# Patient Record
Sex: FEMALE | Race: WHITE | Employment: OTHER | URBAN - NONMETROPOLITAN AREA
[De-identification: names, ages, dates, MRNs, and addresses within clinical notes are randomized per-mention and may not be internally consistent; named-entity substitution may affect disease eponyms.]

---

## 2017-01-04 RX ORDER — CLONAZEPAM 0.5 MG/1
TABLET ORAL
Qty: 30 TABLET | Refills: 0 | Status: SHIPPED | OUTPATIENT
Start: 2017-01-04 | End: 2017-01-27 | Stop reason: SDUPTHER

## 2017-01-27 ENCOUNTER — TELEPHONE (OUTPATIENT)
Dept: PSYCHIATRY | Age: 67
End: 2017-01-27

## 2017-01-27 ENCOUNTER — OFFICE VISIT (OUTPATIENT)
Dept: PSYCHIATRY | Age: 67
End: 2017-01-27
Payer: COMMERCIAL

## 2017-01-27 VITALS
OXYGEN SATURATION: 93 % | WEIGHT: 196.6 LBS | HEART RATE: 65 BPM | SYSTOLIC BLOOD PRESSURE: 150 MMHG | HEIGHT: 70 IN | BODY MASS INDEX: 28.15 KG/M2 | DIASTOLIC BLOOD PRESSURE: 80 MMHG

## 2017-01-27 DIAGNOSIS — F43.21 COMPLICATED GRIEF: ICD-10-CM

## 2017-01-27 DIAGNOSIS — F32.9 MAJOR DEPRESSIVE DISORDER, SINGLE EPISODE, UNSPECIFIED: Primary | ICD-10-CM

## 2017-01-27 DIAGNOSIS — F41.1 GENERALIZED ANXIETY DISORDER: ICD-10-CM

## 2017-01-27 PROCEDURE — 99214 OFFICE O/P EST MOD 30 MIN: CPT | Performed by: NURSE PRACTITIONER

## 2017-01-27 RX ORDER — CLONAZEPAM 0.5 MG/1
TABLET ORAL
Qty: 30 TABLET | Refills: 0 | Status: SHIPPED | OUTPATIENT
Start: 2017-01-27 | End: 2017-03-10 | Stop reason: SDUPTHER

## 2017-01-27 RX ORDER — TRAZODONE HYDROCHLORIDE 150 MG/1
TABLET ORAL
Qty: 180 TABLET | Refills: 1 | Status: SHIPPED | OUTPATIENT
Start: 2017-01-27 | End: 2017-07-06 | Stop reason: SDUPTHER

## 2017-03-10 RX ORDER — CLONAZEPAM 0.5 MG/1
TABLET ORAL
Qty: 30 TABLET | Refills: 0 | Status: SHIPPED | OUTPATIENT
Start: 2017-03-10 | End: 2017-04-12 | Stop reason: SDUPTHER

## 2017-03-15 ENCOUNTER — OFFICE VISIT (OUTPATIENT)
Dept: PSYCHIATRY | Age: 67
End: 2017-03-15
Payer: COMMERCIAL

## 2017-03-15 VITALS
BODY MASS INDEX: 28.6 KG/M2 | OXYGEN SATURATION: 93 % | HEART RATE: 66 BPM | WEIGHT: 199.8 LBS | DIASTOLIC BLOOD PRESSURE: 84 MMHG | SYSTOLIC BLOOD PRESSURE: 139 MMHG | HEIGHT: 70 IN

## 2017-03-15 DIAGNOSIS — F32.9 MAJOR DEPRESSIVE DISORDER, SINGLE EPISODE, UNSPECIFIED: Primary | ICD-10-CM

## 2017-03-15 PROCEDURE — 99213 OFFICE O/P EST LOW 20 MIN: CPT | Performed by: PSYCHIATRY & NEUROLOGY

## 2017-04-12 RX ORDER — CLONAZEPAM 0.5 MG/1
TABLET ORAL
Qty: 30 TABLET | Refills: 0 | Status: SHIPPED | OUTPATIENT
Start: 2017-04-12 | End: 2017-05-12 | Stop reason: SDUPTHER

## 2017-05-01 RX ORDER — ARIPIPRAZOLE 2 MG/1
TABLET ORAL
Qty: 90 TABLET | Refills: 1 | Status: SHIPPED | OUTPATIENT
Start: 2017-05-01 | End: 2018-01-02 | Stop reason: SDUPTHER

## 2017-05-01 RX ORDER — FLUOXETINE HYDROCHLORIDE 40 MG/1
40 CAPSULE ORAL DAILY
Qty: 90 CAPSULE | Refills: 1 | Status: SHIPPED | OUTPATIENT
Start: 2017-05-01 | End: 2018-01-02 | Stop reason: SDUPTHER

## 2017-05-01 RX ORDER — FLUOXETINE HYDROCHLORIDE 20 MG/1
20 CAPSULE ORAL DAILY
Qty: 90 CAPSULE | Refills: 1 | Status: SHIPPED | OUTPATIENT
Start: 2017-05-01 | End: 2018-01-02 | Stop reason: SDUPTHER

## 2017-05-12 RX ORDER — CLONAZEPAM 0.5 MG/1
TABLET ORAL
Qty: 30 TABLET | Refills: 0 | Status: SHIPPED | OUTPATIENT
Start: 2017-05-12 | End: 2017-06-12 | Stop reason: SDUPTHER

## 2017-06-12 RX ORDER — CLONAZEPAM 0.5 MG/1
TABLET ORAL
Qty: 30 TABLET | Refills: 0 | Status: SHIPPED | OUTPATIENT
Start: 2017-06-12 | End: 2018-01-02 | Stop reason: SDUPTHER

## 2017-06-26 ENCOUNTER — OFFICE VISIT (OUTPATIENT)
Dept: PSYCHIATRY | Age: 67
End: 2017-06-26
Payer: COMMERCIAL

## 2017-06-26 VITALS
WEIGHT: 204.6 LBS | DIASTOLIC BLOOD PRESSURE: 87 MMHG | SYSTOLIC BLOOD PRESSURE: 154 MMHG | HEIGHT: 70 IN | OXYGEN SATURATION: 94 % | BODY MASS INDEX: 29.29 KG/M2 | HEART RATE: 82 BPM

## 2017-06-26 DIAGNOSIS — F43.21 COMPLICATED GRIEF: ICD-10-CM

## 2017-06-26 DIAGNOSIS — F32.9 MAJOR DEPRESSIVE DISORDER, SINGLE EPISODE, UNSPECIFIED: Primary | ICD-10-CM

## 2017-06-26 PROCEDURE — 90833 PSYTX W PT W E/M 30 MIN: CPT | Performed by: PSYCHIATRY & NEUROLOGY

## 2017-06-26 PROCEDURE — 99213 OFFICE O/P EST LOW 20 MIN: CPT | Performed by: PSYCHIATRY & NEUROLOGY

## 2017-06-26 RX ORDER — ATORVASTATIN CALCIUM 40 MG/1
TABLET, FILM COATED ORAL
COMMUNITY
Start: 2017-06-07

## 2017-06-26 RX ORDER — MONTELUKAST SODIUM 10 MG/1
TABLET ORAL
COMMUNITY
Start: 2017-04-22

## 2017-06-26 RX ORDER — METHYLPHENIDATE HYDROCHLORIDE 5 MG/1
5 TABLET ORAL 2 TIMES DAILY
Qty: 60 TABLET | Refills: 0 | Status: SHIPPED | OUTPATIENT
Start: 2017-06-26 | End: 2017-07-24 | Stop reason: SDUPTHER

## 2017-06-26 RX ORDER — MONTELUKAST SODIUM 4 MG/1
TABLET, CHEWABLE ORAL
COMMUNITY
Start: 2017-04-22 | End: 2018-07-13 | Stop reason: SDUPTHER

## 2017-07-10 RX ORDER — TRAZODONE HYDROCHLORIDE 150 MG/1
TABLET ORAL
Qty: 180 TABLET | Refills: 1 | Status: SHIPPED | OUTPATIENT
Start: 2017-07-10 | End: 2018-01-02 | Stop reason: SDUPTHER

## 2017-07-11 RX ORDER — CLONAZEPAM 0.5 MG/1
0.5 TABLET ORAL 2 TIMES DAILY
Qty: 60 TABLET | Refills: 3 | Status: SHIPPED | OUTPATIENT
Start: 2017-07-11 | End: 2018-01-02 | Stop reason: SDUPTHER

## 2017-07-26 RX ORDER — METHYLPHENIDATE HYDROCHLORIDE 5 MG/1
5 TABLET ORAL 2 TIMES DAILY
Qty: 60 TABLET | Refills: 0 | Status: SHIPPED | OUTPATIENT
Start: 2017-07-26 | End: 2017-08-23 | Stop reason: SDUPTHER

## 2017-08-23 RX ORDER — METHYLPHENIDATE HYDROCHLORIDE 5 MG/1
5 TABLET ORAL 2 TIMES DAILY
Qty: 60 TABLET | Refills: 0 | Status: SHIPPED | OUTPATIENT
Start: 2017-08-23 | End: 2017-09-22

## 2017-09-27 ENCOUNTER — TELEPHONE (OUTPATIENT)
Dept: PSYCHIATRY | Age: 67
End: 2017-09-27

## 2017-09-27 ENCOUNTER — OFFICE VISIT (OUTPATIENT)
Dept: PSYCHIATRY | Age: 67
End: 2017-09-27
Payer: COMMERCIAL

## 2017-09-27 VITALS
BODY MASS INDEX: 30.07 KG/M2 | HEIGHT: 69 IN | DIASTOLIC BLOOD PRESSURE: 76 MMHG | SYSTOLIC BLOOD PRESSURE: 141 MMHG | WEIGHT: 203 LBS | HEART RATE: 76 BPM | OXYGEN SATURATION: 94 %

## 2017-09-27 DIAGNOSIS — F41.1 GENERALIZED ANXIETY DISORDER: ICD-10-CM

## 2017-09-27 DIAGNOSIS — F32.9 MAJOR DEPRESSIVE DISORDER, SINGLE EPISODE, UNSPECIFIED: Primary | ICD-10-CM

## 2017-09-27 PROCEDURE — 90833 PSYTX W PT W E/M 30 MIN: CPT | Performed by: PSYCHIATRY & NEUROLOGY

## 2017-09-27 PROCEDURE — 99213 OFFICE O/P EST LOW 20 MIN: CPT | Performed by: PSYCHIATRY & NEUROLOGY

## 2017-09-27 RX ORDER — METHYLPHENIDATE HYDROCHLORIDE 5 MG/1
5 TABLET ORAL 2 TIMES DAILY
Qty: 60 TABLET | Refills: 0 | Status: SHIPPED | OUTPATIENT
Start: 2017-09-27 | End: 2017-11-20 | Stop reason: SDUPTHER

## 2017-09-27 RX ORDER — FLUOXETINE HYDROCHLORIDE 40 MG/1
40 CAPSULE ORAL DAILY
Qty: 90 CAPSULE | Refills: 1 | Status: SHIPPED | OUTPATIENT
Start: 2017-09-27 | End: 2018-01-02 | Stop reason: SDUPTHER

## 2017-09-27 RX ORDER — FLUOXETINE HYDROCHLORIDE 20 MG/1
20 CAPSULE ORAL DAILY
Qty: 90 CAPSULE | Refills: 1 | Status: SHIPPED | OUTPATIENT
Start: 2017-09-27 | End: 2018-01-02 | Stop reason: SDUPTHER

## 2017-09-27 RX ORDER — CLONAZEPAM 0.5 MG/1
0.5 TABLET ORAL 3 TIMES DAILY PRN
Qty: 90 TABLET | Refills: 2 | Status: SHIPPED | OUTPATIENT
Start: 2017-09-27 | End: 2018-01-02 | Stop reason: SDUPTHER

## 2017-11-20 DIAGNOSIS — F32.9 MAJOR DEPRESSIVE DISORDER WITH SINGLE EPISODE, REMISSION STATUS UNSPECIFIED: ICD-10-CM

## 2017-11-20 DIAGNOSIS — F41.1 GENERALIZED ANXIETY DISORDER: ICD-10-CM

## 2017-11-20 NOTE — TELEPHONE ENCOUNTER
as needed for sleep 7/10/17   Keyona Adames DO   atorvastatin (LIPITOR) 40 MG tablet  6/7/17   Historical Provider, MD   colestipol (COLESTID) 1 g tablet  4/22/17   Historical Provider, MD   montelukast (SINGULAIR) 10 MG tablet  4/22/17   Historical Provider, MD   clonazePAM (KLONOPIN) 0.5 MG tablet Take 0.5 or 1 tab by mouth at bedtime as needed for sleep/anxiety (May fill on 2/2/17) 6/12/17   Keyona Adames DO   FLUoxetine (PROZAC) 20 MG capsule Take 1 capsule by mouth daily (take with 1 Prozac 40 mg cap daily) 5/1/17   Keyona Adames DO   FLUoxetine (PROZAC) 40 MG capsule Take 1 capsule by mouth daily 5/1/17   Keyona Adames DO   ARIPiprazole (ABILIFY) 2 MG tablet Take  One tablet daily. 5/1/17   Keyona Adames DO   VITAMIN D, CHOLECALCIFEROL, PO Take by mouth daily    Historical Provider, MD   hyoscyamine (ANASPAZ;LEVSIN) 0.125 MG tablet Take 0.125 mg by mouth 2 times daily    Historical Provider, MD   lisinopril (PRINIVIL;ZESTRIL) 10 MG tablet 10 mg daily  1/23/16   Historical Provider, MD   esomeprazole (NEXIUM) 40 MG capsule Take 40 mg by mouth daily  1/23/16   Historical Provider, MD   BYSTOLIC 10 MG tablet 2 times daily  1/23/16   Historical Provider, MD   Calcium Polycarbophil (FIBERCON PO) Take by mouth 2 times daily    Historical Provider, MD   calcium carbonate (OSCAL) 500 MG TABS tablet Take 500 mg by mouth 2 times daily    Historical Provider, MD   Glucosamine-Chondroitin (GLUCOSAMINE CHONDR COMPLEX PO) Take by mouth    Historical Provider, MD   Cetirizine HCl 10 MG CAPS Take by mouth daily    Historical Provider, MD   Multiple Vitamins-Minerals (CENTRUM SILVER ULTRA WOMENS PO) Take by mouth daily    Historical Provider, MD       @    MSE:  Patient is  A & O x3. Appearance:  well-appearing  Cognition:  Recent memory intact , remote memory intact , good fund of knowledge,  average intelligence level. Speech:  normal  Language: Naming: intact;  Word Finding: intact  Conversation no evidence of delusions  Behavior:  Cooperative  Mood: improved  Affect: congruent with mood  Thought Content: negative   Thought Process: goal directed  Judgement Insight:  normal and appropriate  Gait and Station:normal gait and station   Musculoskeletal:no issues    Assesment: Major Depression, recurrent  Plan:  1. The risks, benefits, side effects, indications, contraindications, and adverse effects of the medications have been discussed. 2. The pt has verbalized understanding and has capacity to give informed consent. 3. The Tomi Hermosillo report has been reviewed according to [place] [facility] regulations. 4. Supportive therapy offered. 5. Individual therapy: options discussed  6. Follow up: No Follow-up on file. 3 months  7. The patient has been advised to call with any problems. 8. Controlled substance Treatment Plan: this is a maintenance dose. .  9. The above listed medications have been continued, modifications in meds and other orders/labs as follows: No orders of the defined types were placed in this encounter. No orders of the defined types were placed in this encounter. 10. Additional comments:    ·  Future planning as therapeutic intervention              Ngoc Head Ed.D.D.O.DFAPA

## 2017-11-21 RX ORDER — METHYLPHENIDATE HYDROCHLORIDE 5 MG/1
5 TABLET ORAL 2 TIMES DAILY
Qty: 60 TABLET | Refills: 0 | Status: SHIPPED | OUTPATIENT
Start: 2017-11-21 | End: 2017-12-21

## 2018-01-02 ENCOUNTER — OFFICE VISIT (OUTPATIENT)
Dept: PSYCHIATRY | Age: 68
End: 2018-01-02
Payer: MEDICARE

## 2018-01-02 VITALS
OXYGEN SATURATION: 95 % | HEIGHT: 69 IN | SYSTOLIC BLOOD PRESSURE: 130 MMHG | HEART RATE: 79 BPM | DIASTOLIC BLOOD PRESSURE: 74 MMHG

## 2018-01-02 DIAGNOSIS — F43.21 COMPLICATED GRIEF: ICD-10-CM

## 2018-01-02 DIAGNOSIS — F32.0 MILD SINGLE CURRENT EPISODE OF MAJOR DEPRESSIVE DISORDER (HCC): Primary | ICD-10-CM

## 2018-01-02 DIAGNOSIS — F41.1 GENERALIZED ANXIETY DISORDER: ICD-10-CM

## 2018-01-02 PROCEDURE — 99214 OFFICE O/P EST MOD 30 MIN: CPT | Performed by: NURSE PRACTITIONER

## 2018-01-02 RX ORDER — ARIPIPRAZOLE 2 MG/1
TABLET ORAL
Qty: 90 TABLET | Refills: 1 | Status: SHIPPED | OUTPATIENT
Start: 2018-01-02 | End: 2018-05-16 | Stop reason: ALTCHOICE

## 2018-01-02 RX ORDER — CLONAZEPAM 0.5 MG/1
TABLET ORAL
Qty: 90 TABLET | Refills: 0 | Status: SHIPPED | OUTPATIENT
Start: 2018-01-02 | End: 2018-02-05 | Stop reason: SDUPTHER

## 2018-01-02 RX ORDER — FLUOXETINE HYDROCHLORIDE 20 MG/1
20 CAPSULE ORAL DAILY
Qty: 90 CAPSULE | Refills: 1 | Status: SHIPPED | OUTPATIENT
Start: 2018-01-02 | End: 2018-04-12 | Stop reason: SDUPTHER

## 2018-01-02 RX ORDER — TRAZODONE HYDROCHLORIDE 150 MG/1
TABLET ORAL
Qty: 180 TABLET | Refills: 1 | Status: SHIPPED | OUTPATIENT
Start: 2018-01-02 | End: 2019-05-03

## 2018-01-02 RX ORDER — METHYLPHENIDATE HYDROCHLORIDE 5 MG/1
5 TABLET ORAL 2 TIMES DAILY
Qty: 60 TABLET | Refills: 0 | Status: SHIPPED | OUTPATIENT
Start: 2018-01-02 | End: 2018-02-16 | Stop reason: SDUPTHER

## 2018-01-02 RX ORDER — FLUOXETINE HYDROCHLORIDE 40 MG/1
40 CAPSULE ORAL DAILY
Qty: 90 CAPSULE | Refills: 1 | Status: SHIPPED | OUTPATIENT
Start: 2018-01-02 | End: 2018-04-12 | Stop reason: SDUPTHER

## 2018-01-02 NOTE — PROGRESS NOTES
overt psychosis, delusional thought or suicidal /homicidal ideation or plan  Thought Process: linear, goal directed and coherent  Judgement Insight:  normal and appropriate  Gait and Station:normal gait and station and normal balance   Musculoskeletal: WNL      Assesment:   1. Mild single current episode of major depressive disorder ([facility] [address]    2. Complicated grief    3. Generalized anxiety disorder            Plan:  Recommend mag oil for anxiety/mood/insomnia  Continue Ritalin 5 mg BID for treatment resistant depression  Continue Abilify 2 mg daily for mood stabilizaton  Continue Prozac 20 mg + 40 mg for depression/anxiety  Continue Klonopin 0.5 mg TID PRN for anxiety  Continue Trazodone 150 - 300 mg QHS PRN sleep  1. The risks, benefits, side effects, indications, contraindications, and adverse effects of the medications have been discussed. Yes.  2. The pt has verbalized understanding and has capacity to give informed consent. 3. The [facility] report has been reviewed according to [facility] regulations. 4. Supportive therapy offered. 5. Follow up: Return in about 3 months (around 4/2/2018), or if symptoms worsen or fail to improve. 6. The patient has been advised to call with any problems. 7. Controlled substance Treatment Plan: this is a maintenance dose. .  8. The above listed medications have been continued, modifications in meds and other orders/labs as follows:      Orders Placed This Encounter   Medications    clonazePAM (KLONOPIN) 0.5 MG tablet     Sig: Take 1 tab by mouth every morning and two tabs by mouth at bedtime. Dispense:  90 tablet     Refill:  0    ARIPiprazole (ABILIFY) 2 MG tablet     Sig: Take  One tablet daily.      Dispense:  90 tablet     Refill:  1    FLUoxetine (PROZAC) 20 MG capsule     Sig: Take 1 capsule by mouth daily     Dispense:  90 capsule     Refill:  1    FLUoxetine (PROZAC) 40 MG capsule     Sig: Take 1 capsule by mouth daily (take in conjunction with Fluoxetine 20 mg cap)

## 2018-02-05 DIAGNOSIS — F32.0 MILD SINGLE CURRENT EPISODE OF MAJOR DEPRESSIVE DISORDER (HCC): ICD-10-CM

## 2018-02-05 DIAGNOSIS — F41.1 GENERALIZED ANXIETY DISORDER: ICD-10-CM

## 2018-02-05 RX ORDER — CLONAZEPAM 0.5 MG/1
TABLET ORAL
Qty: 90 TABLET | Refills: 0 | Status: SHIPPED | OUTPATIENT
Start: 2018-02-05 | End: 2018-03-05 | Stop reason: SDUPTHER

## 2018-02-16 RX ORDER — METHYLPHENIDATE HYDROCHLORIDE 5 MG/1
5 TABLET ORAL 2 TIMES DAILY
Qty: 60 TABLET | Refills: 0 | Status: SHIPPED | OUTPATIENT
Start: 2018-02-16 | End: 2018-03-18

## 2018-02-16 NOTE — TELEPHONE ENCOUNTER
Pt notified that Rx is ready for p/u at .
tablet daily. 1/2/18   Doyle Bond NP   FLUoxetine (PROZAC) 20 MG capsule Take 1 capsule by mouth daily 1/2/18   Doyle Bond NP   FLUoxetine (PROZAC) 40 MG capsule Take 1 capsule by mouth daily (take in conjunction with Fluoxetine 20 mg cap) 1/2/18   Doyle Bond NP   traZODone (DESYREL) 150 MG tablet Take 1 up to 2 whole tabs by mouth at bedtime as needed for sleep 1/2/18   Doyle Bond NP   atorvastatin (LIPITOR) 40 MG tablet  6/7/17   Historical Provider, MD   colestipol (COLESTID) 1 g tablet  4/22/17   Historical Provider, MD   montelukast (SINGULAIR) 10 MG tablet  4/22/17   Historical Provider, MD   VITAMIN D, CHOLECALCIFEROL, PO Take by mouth daily    Historical Provider, MD   hyoscyamine (ANASPAZ;LEVSIN) 0.125 MG tablet Take 0.125 mg by mouth 2 times daily    Historical Provider, MD   lisinopril (PRINIVIL;ZESTRIL) 10 MG tablet 10 mg daily  1/23/16   Historical Provider, MD   esomeprazole (NEXIUM) 40 MG capsule Take 40 mg by mouth daily  1/23/16   Historical Provider, MD   BYSTOLIC 10 MG tablet 2 times daily  1/23/16   Historical Provider, MD   Calcium Polycarbophil (FIBERCON PO) Take by mouth 2 times daily    Historical Provider, MD   calcium carbonate (OSCAL) 500 MG TABS tablet Take 500 mg by mouth 2 times daily    Historical Provider, MD   Glucosamine-Chondroitin (GLUCOSAMINE CHONDR COMPLEX PO) Take by mouth    Historical Provider, MD   Cetirizine HCl 10 MG CAPS Take by mouth daily    Historical Provider, MD   Multiple Vitamins-Minerals (CENTRUM SILVER ULTRA WOMENS PO) Take by mouth daily    Historical Provider, MD           MSE:  Patient is  A & O x3. Appearance:  well-appearing, in chair, good grooming and good hygiene appropriately dressed for season and age. Cognition:  Recent memory intact , remote memory intact , good fund of knowledge, average  intelligence level. Speech:  normal  Language: Naming: intact;  Word Finding: intact  Conversation no evidence of

## 2018-03-05 DIAGNOSIS — F41.1 GENERALIZED ANXIETY DISORDER: ICD-10-CM

## 2018-03-05 DIAGNOSIS — F32.0 MILD SINGLE CURRENT EPISODE OF MAJOR DEPRESSIVE DISORDER (HCC): ICD-10-CM

## 2018-03-05 RX ORDER — CLONAZEPAM 0.5 MG/1
TABLET ORAL
Qty: 90 TABLET | Refills: 0 | Status: SHIPPED | OUTPATIENT
Start: 2018-03-05 | End: 2018-04-05 | Stop reason: SDUPTHER

## 2018-03-05 NOTE — TELEPHONE ENCOUNTER
0.5 MG tablet Take 1 tab by mouth every morning and two tabs by mouth at bedtime. 2/5/18 3/8/18  CHARLOTTE Peña   ARIPiprazole (ABILIFY) 2 MG tablet Take  One tablet daily. 1/2/18   Bethanne Layer, NP   FLUoxetine (PROZAC) 20 MG capsule Take 1 capsule by mouth daily 1/2/18   Bethanne Layer, NP   FLUoxetine (PROZAC) 40 MG capsule Take 1 capsule by mouth daily (take in conjunction with Fluoxetine 20 mg cap) 1/2/18   Bethanne Layer, NP   traZODone (DESYREL) 150 MG tablet Take 1 up to 2 whole tabs by mouth at bedtime as needed for sleep 1/2/18   Roxyne Layer, NP   atorvastatin (LIPITOR) 40 MG tablet  6/7/17   Historical Provider, MD   colestipol (COLESTID) 1 g tablet  4/22/17   Historical Provider, MD   montelukast (SINGULAIR) 10 MG tablet  4/22/17   Historical Provider, MD   VITAMIN D, CHOLECALCIFEROL, PO Take by mouth daily    Historical Provider, MD   hyoscyamine (ANASPAZ;LEVSIN) 0.125 MG tablet Take 0.125 mg by mouth 2 times daily    Historical Provider, MD   lisinopril (PRINIVIL;ZESTRIL) 10 MG tablet 10 mg daily  1/23/16   Historical Provider, MD   esomeprazole (NEXIUM) 40 MG capsule Take 40 mg by mouth daily  1/23/16   Historical Provider, MD   BYSTOLIC 10 MG tablet 2 times daily  1/23/16   Historical Provider, MD   Calcium Polycarbophil (FIBERCON PO) Take by mouth 2 times daily    Historical Provider, MD   calcium carbonate (OSCAL) 500 MG TABS tablet Take 500 mg by mouth 2 times daily    Historical Provider, MD   Glucosamine-Chondroitin (GLUCOSAMINE CHONDR COMPLEX PO) Take by mouth    Historical Provider, MD   Cetirizine HCl 10 MG CAPS Take by mouth daily    Historical Provider, MD   Multiple Vitamins-Minerals (CENTRUM SILVER ULTRA WOMENS PO) Take by mouth daily    Historical Provider, MD           MSE:  Patient is  A & O x3. Appearance:  well-appearing, in chair, good grooming and good hygiene appropriately dressed for season and age.   Cognition:  Recent memory intact , remote memory intact ,

## 2018-04-05 DIAGNOSIS — F41.1 GENERALIZED ANXIETY DISORDER: ICD-10-CM

## 2018-04-05 DIAGNOSIS — F32.0 MILD SINGLE CURRENT EPISODE OF MAJOR DEPRESSIVE DISORDER (HCC): ICD-10-CM

## 2018-04-05 RX ORDER — CLONAZEPAM 0.5 MG/1
TABLET ORAL
Qty: 90 TABLET | Refills: 0 | Status: SHIPPED | OUTPATIENT
Start: 2018-04-05 | End: 2018-05-04 | Stop reason: SDUPTHER

## 2018-04-12 DIAGNOSIS — F41.1 GENERALIZED ANXIETY DISORDER: ICD-10-CM

## 2018-04-12 DIAGNOSIS — F32.0 MILD SINGLE CURRENT EPISODE OF MAJOR DEPRESSIVE DISORDER (HCC): ICD-10-CM

## 2018-04-12 RX ORDER — FLUOXETINE HYDROCHLORIDE 40 MG/1
CAPSULE ORAL
Qty: 90 CAPSULE | Refills: 0 | Status: SHIPPED | OUTPATIENT
Start: 2018-04-12 | End: 2018-05-04 | Stop reason: SDUPTHER

## 2018-04-12 RX ORDER — FLUOXETINE HYDROCHLORIDE 20 MG/1
CAPSULE ORAL
Qty: 90 CAPSULE | Refills: 0 | Status: SHIPPED | OUTPATIENT
Start: 2018-04-12 | End: 2018-05-04 | Stop reason: SDUPTHER

## 2018-05-04 DIAGNOSIS — F32.0 MILD SINGLE CURRENT EPISODE OF MAJOR DEPRESSIVE DISORDER (HCC): ICD-10-CM

## 2018-05-04 DIAGNOSIS — F41.1 GENERALIZED ANXIETY DISORDER: ICD-10-CM

## 2018-05-04 RX ORDER — CLONAZEPAM 0.5 MG/1
TABLET ORAL
Qty: 90 TABLET | Refills: 0 | Status: SHIPPED | OUTPATIENT
Start: 2018-05-04 | End: 2018-06-05 | Stop reason: SDUPTHER

## 2018-05-04 RX ORDER — FLUOXETINE HYDROCHLORIDE 40 MG/1
CAPSULE ORAL
Qty: 90 CAPSULE | Refills: 0 | Status: SHIPPED | OUTPATIENT
Start: 2018-05-04 | End: 2018-09-04 | Stop reason: SDUPTHER

## 2018-05-04 RX ORDER — FLUOXETINE HYDROCHLORIDE 20 MG/1
CAPSULE ORAL
Qty: 90 CAPSULE | Refills: 0 | Status: SHIPPED | OUTPATIENT
Start: 2018-05-04 | End: 2018-05-22

## 2018-05-16 ENCOUNTER — OFFICE VISIT (OUTPATIENT)
Dept: PSYCHIATRY | Age: 68
End: 2018-05-16
Payer: MEDICARE

## 2018-05-16 VITALS
WEIGHT: 203 LBS | HEART RATE: 70 BPM | SYSTOLIC BLOOD PRESSURE: 141 MMHG | OXYGEN SATURATION: 95 % | BODY MASS INDEX: 30.07 KG/M2 | DIASTOLIC BLOOD PRESSURE: 78 MMHG | HEIGHT: 69 IN

## 2018-05-16 DIAGNOSIS — F43.21 COMPLICATED GRIEF: ICD-10-CM

## 2018-05-16 DIAGNOSIS — F32.9 MAJOR DEPRESSIVE DISORDER WITH SINGLE EPISODE, REMISSION STATUS UNSPECIFIED: ICD-10-CM

## 2018-05-16 DIAGNOSIS — F41.1 GENERALIZED ANXIETY DISORDER: Primary | ICD-10-CM

## 2018-05-16 PROCEDURE — 99214 OFFICE O/P EST MOD 30 MIN: CPT | Performed by: NURSE PRACTITIONER

## 2018-05-22 ENCOUNTER — OFFICE VISIT (OUTPATIENT)
Dept: GASTROENTEROLOGY | Age: 68
End: 2018-05-22
Payer: MEDICARE

## 2018-05-22 VITALS
DIASTOLIC BLOOD PRESSURE: 76 MMHG | WEIGHT: 196 LBS | HEIGHT: 68 IN | SYSTOLIC BLOOD PRESSURE: 139 MMHG | BODY MASS INDEX: 29.7 KG/M2 | OXYGEN SATURATION: 96 % | HEART RATE: 82 BPM

## 2018-05-22 DIAGNOSIS — R15.2 INCONTINENCE OF FECES WITH FECAL URGENCY: ICD-10-CM

## 2018-05-22 DIAGNOSIS — R15.9 INCONTINENCE OF FECES WITH FECAL URGENCY: ICD-10-CM

## 2018-05-22 DIAGNOSIS — K62.5 RECTAL BLEEDING: Primary | ICD-10-CM

## 2018-05-22 DIAGNOSIS — K58.0 IRRITABLE BOWEL SYNDROME WITH DIARRHEA: ICD-10-CM

## 2018-05-22 DIAGNOSIS — Z98.890 HISTORY OF HEMORRHOIDECTOMY: ICD-10-CM

## 2018-05-22 PROCEDURE — 4040F PNEUMOC VAC/ADMIN/RCVD: CPT | Performed by: NURSE PRACTITIONER

## 2018-05-22 PROCEDURE — 3017F COLORECTAL CA SCREEN DOC REV: CPT | Performed by: NURSE PRACTITIONER

## 2018-05-22 PROCEDURE — 99214 OFFICE O/P EST MOD 30 MIN: CPT | Performed by: NURSE PRACTITIONER

## 2018-05-22 PROCEDURE — 1123F ACP DISCUSS/DSCN MKR DOCD: CPT | Performed by: NURSE PRACTITIONER

## 2018-05-22 PROCEDURE — 1036F TOBACCO NON-USER: CPT | Performed by: NURSE PRACTITIONER

## 2018-05-22 PROCEDURE — 1090F PRES/ABSN URINE INCON ASSESS: CPT | Performed by: NURSE PRACTITIONER

## 2018-05-22 PROCEDURE — G8427 DOCREV CUR MEDS BY ELIG CLIN: HCPCS | Performed by: NURSE PRACTITIONER

## 2018-05-22 PROCEDURE — G8400 PT W/DXA NO RESULTS DOC: HCPCS | Performed by: NURSE PRACTITIONER

## 2018-05-22 PROCEDURE — G8419 CALC BMI OUT NRM PARAM NOF/U: HCPCS | Performed by: NURSE PRACTITIONER

## 2018-05-22 RX ORDER — DICYCLOMINE HYDROCHLORIDE 10 MG/1
10 CAPSULE ORAL
Qty: 120 CAPSULE | Refills: 2 | Status: SHIPPED | OUTPATIENT
Start: 2018-05-22 | End: 2018-08-18 | Stop reason: SDUPTHER

## 2018-05-22 ASSESSMENT — ENCOUNTER SYMPTOMS
ABDOMINAL DISTENTION: 0
BLOOD IN STOOL: 1
VOMITING: 0
SHORTNESS OF BREATH: 0
CONSTIPATION: 0
BACK PAIN: 0
DIARRHEA: 1
SORE THROAT: 0
RECTAL PAIN: 0
CHEST TIGHTNESS: 0
NAUSEA: 0
VOICE CHANGE: 0
COUGH: 0
ABDOMINAL PAIN: 0

## 2018-06-05 DIAGNOSIS — F32.0 MILD SINGLE CURRENT EPISODE OF MAJOR DEPRESSIVE DISORDER (HCC): ICD-10-CM

## 2018-06-05 DIAGNOSIS — F41.1 GENERALIZED ANXIETY DISORDER: ICD-10-CM

## 2018-06-05 RX ORDER — CLONAZEPAM 0.5 MG/1
TABLET ORAL
Qty: 90 TABLET | Refills: 0 | Status: SHIPPED | OUTPATIENT
Start: 2018-06-05 | End: 2018-07-05 | Stop reason: SDUPTHER

## 2018-07-05 DIAGNOSIS — F41.1 GENERALIZED ANXIETY DISORDER: ICD-10-CM

## 2018-07-05 DIAGNOSIS — F32.0 MILD SINGLE CURRENT EPISODE OF MAJOR DEPRESSIVE DISORDER (HCC): ICD-10-CM

## 2018-07-05 RX ORDER — CLONAZEPAM 0.5 MG/1
TABLET ORAL
Qty: 90 TABLET | Refills: 0 | Status: SHIPPED | OUTPATIENT
Start: 2018-07-05 | End: 2018-08-08 | Stop reason: SDUPTHER

## 2018-07-05 NOTE — TELEPHONE ENCOUNTER
Pt is needing a refill on the following; Pt was last seen on 05/16/18 and has a follow up on 11/13/18 7/5/2018 9:29 AM   Progress Note        Luis Carlos Mitch 1950  Psychotherapy Time Spent: 20 min      Psychotherapy Topics: health and sleep and medication management    Chief Complaint   Patient presents with    Medication Refill         Subjective:  Patient is a 74year old  diagnosed with MDD, SIMONA, and complicated grief and presents today for follow-up. Last seen in clinic by ULYSSES Rodriges, in coverage for Dr. Edmond Santana on 1/2/18 and prior records were reviewed. Today patient states, \"things are fine. \" Says she takes the trazodone \"randomly, not on a regular basis. They give me a headache in the morning. \" Sleep is ok. Reports she gets 5-6 hours nightly. Appetite is good. Reports \"spurts\" of depression and anxiety that is usually triggered by something. Patient attends counseling with Colleen Hickey once a month. No medication changes needed at this time. Patient reports side effects as follows: none. No evidence of EPS, no cogwheeling or abnormal motor movements. Absent  suicidal ideation. Reports compliance with medications as good . Review of Systems - 12 point review negative  History obtained via chart review and patient  PCP is CHARLOTTE Shultz      Current Meds:    Prior to Admission medications    Medication Sig Start Date End Date Taking? Authorizing Provider   clonazePAM (KLONOPIN) 0.5 MG tablet Take 1 tab by mouth every morning and two tabs by mouth at bedtime.  6/5/18 7/6/18  CHARLOTTE Tyler   dicyclomine (BENTYL) 10 MG capsule Take 1 capsule by mouth 4 times daily (before meals and nightly) 5/22/18   CHARLOTTE Quiroz   FLUoxetine (PROZAC) 40 MG capsule TAKE 1 CAPSULE DAILY 5/4/18   CHARLOTTE Tyler   traZODone (DESYREL) 150 MG tablet Take 1 up to 2 whole tabs by mouth at bedtime as needed for sleep 1/2/18   CHARLOTTE Arevalo - NP   atorvastatin (LIPITOR) 40 MG tablet  6/7/17   Historical Provider, MD   colestipol (COLESTID) 1 g tablet  4/22/17   Historical Provider, MD   montelukast (SINGULAIR) 10 MG tablet  4/22/17   Historical Provider, MD   VITAMIN D, CHOLECALCIFEROL, PO Take by mouth daily    Historical Provider, MD   lisinopril (PRINIVIL;ZESTRIL) 10 MG tablet 10 mg daily  1/23/16   Historical Provider, MD   esomeprazole (NEXIUM) 40 MG capsule Take 40 mg by mouth daily  1/23/16   Historical Provider, MD   BYSTOLIC 10 MG tablet 2 times daily  1/23/16   Historical Provider, MD   Calcium Polycarbophil (FIBERCON PO) Take by mouth 2 times daily    Historical Provider, MD   calcium carbonate (OSCAL) 500 MG TABS tablet Take 500 mg by mouth 2 times daily    Historical Provider, MD   Glucosamine-Chondroitin (GLUCOSAMINE CHONDR COMPLEX PO) Take by mouth    Historical Provider, MD   Cetirizine HCl 10 MG CAPS Take by mouth daily    Historical Provider, MD   Multiple Vitamins-Minerals (CENTRUM SILVER ULTRA WOMENS PO) Take by mouth daily    Historical Provider, MD       MSE:  Patient is  A & O x3. Appearance:  well-appearing appropriately dressed for season and age. Cognition:  Recent memory intact , remote memory intact , good fund of knowledge, below average  intelligence level. Speech:  normal  Language: Naming: intact; Word Finding: intact  Conversation no evidence of delusions  Behavior:  Cooperative, Good eye contact and guarded  Mood: depressed, anxious  Affect: restricted  Thought Content: no evidence of overt psychosis, delusional thought or suicidal /homicidal ideation or plan  Thought Process: linear, coherent  Judgement Insight:  normal and appropriate  Gait and Station:normal gait and station and normal balance   Musculoskeletal: WNL      Assesment:   1. Mild single current episode of major depressive disorder ([facility] [address]    2.  Generalized anxiety disorder        Plan:  Continue clonazepam 0.5 mg PO take 1 tablet qam and 2 tablets qhs for anxiety/sleep  Continue Prozac 60 mg PO qd for depression/anxiety  Discontinue Trazodone d/t headache  Start melatonin 10 mg prn sleep  1. The risks, benefits, side effects, indications, contraindications, and adverse effects of the medications have been discussed. Yes.  2. The pt has verbalized understanding and has capacity to give informed consent. 3. The Harish Mendiola report has been reviewed according to [facility] regulations. 4. Supportive therapy offered. 5. Follow up: No Follow-up on file. 6. The patient has been advised to call with any problems. 7. Controlled substance Treatment Plan: this is a maintenance dose. 8. The above listed medications have been continued, modifications in meds and other orders/labs as follows: No orders of the defined types were placed in this encounter. No orders of the defined types were placed in this encounter. 9. Additional comments:      SHABBIR BainsP-BC  Requested Prescriptions     Pending Prescriptions Disp Refills    clonazePAM (KLONOPIN) 0.5 MG tablet 90 tablet 0     Sig: Take 1 tab by mouth every morning and two tabs by mouth at bedtime.

## 2018-07-11 ENCOUNTER — HOSPITAL ENCOUNTER (OUTPATIENT)
Age: 68
Setting detail: OUTPATIENT SURGERY
Discharge: HOME | End: 2018-07-11
Attending: INTERNAL MEDICINE | Admitting: INTERNAL MEDICINE
Payer: MEDICARE

## 2018-07-11 ENCOUNTER — ANESTHESIA (OUTPATIENT)
Dept: OPERATING ROOM | Age: 68
End: 2018-07-11

## 2018-07-11 ENCOUNTER — ANESTHESIA EVENT (OUTPATIENT)
Dept: OPERATING ROOM | Age: 68
End: 2018-07-11

## 2018-07-11 VITALS — OXYGEN SATURATION: 95 % | DIASTOLIC BLOOD PRESSURE: 65 MMHG | SYSTOLIC BLOOD PRESSURE: 117 MMHG

## 2018-07-11 VITALS
SYSTOLIC BLOOD PRESSURE: 104 MMHG | OXYGEN SATURATION: 92 % | HEART RATE: 72 BPM | TEMPERATURE: 98.3 F | BODY MASS INDEX: 28.88 KG/M2 | WEIGHT: 195 LBS | HEIGHT: 69 IN | DIASTOLIC BLOOD PRESSURE: 55 MMHG | RESPIRATION RATE: 16 BRPM

## 2018-07-11 PROCEDURE — 45378 DIAGNOSTIC COLONOSCOPY: CPT | Performed by: INTERNAL MEDICINE

## 2018-07-11 PROCEDURE — G8907 PT DOC NO EVENTS ON DISCHARG: HCPCS

## 2018-07-11 PROCEDURE — G8918 PT W/O PREOP ORDER IV AB PRO: HCPCS

## 2018-07-11 PROCEDURE — 45378 DIAGNOSTIC COLONOSCOPY: CPT

## 2018-07-11 RX ORDER — ONDANSETRON 2 MG/ML
4 INJECTION INTRAMUSCULAR; INTRAVENOUS
Status: DISCONTINUED | OUTPATIENT
Start: 2018-07-11 | End: 2018-07-11 | Stop reason: HOSPADM

## 2018-07-11 RX ORDER — LIDOCAINE HYDROCHLORIDE 10 MG/ML
INJECTION, SOLUTION EPIDURAL; INFILTRATION; INTRACAUDAL; PERINEURAL PRN
Status: DISCONTINUED | OUTPATIENT
Start: 2018-07-11 | End: 2018-07-11 | Stop reason: SDUPTHER

## 2018-07-11 RX ORDER — DIPHENHYDRAMINE HYDROCHLORIDE 50 MG/ML
12.5 INJECTION INTRAMUSCULAR; INTRAVENOUS
Status: DISCONTINUED | OUTPATIENT
Start: 2018-07-11 | End: 2018-07-11 | Stop reason: HOSPADM

## 2018-07-11 RX ORDER — SODIUM CHLORIDE 9 MG/ML
INJECTION, SOLUTION INTRAVENOUS CONTINUOUS
Status: DISCONTINUED | OUTPATIENT
Start: 2018-07-11 | End: 2018-07-11 | Stop reason: HOSPADM

## 2018-07-11 RX ORDER — PROMETHAZINE HYDROCHLORIDE 25 MG/ML
6.25 INJECTION, SOLUTION INTRAMUSCULAR; INTRAVENOUS
Status: DISCONTINUED | OUTPATIENT
Start: 2018-07-11 | End: 2018-07-11 | Stop reason: HOSPADM

## 2018-07-11 RX ORDER — PROPOFOL 10 MG/ML
INJECTION, EMULSION INTRAVENOUS PRN
Status: DISCONTINUED | OUTPATIENT
Start: 2018-07-11 | End: 2018-07-11 | Stop reason: SDUPTHER

## 2018-07-11 RX ADMIN — SODIUM CHLORIDE: 9 INJECTION, SOLUTION INTRAVENOUS at 11:18

## 2018-07-11 RX ADMIN — SODIUM CHLORIDE: 9 INJECTION, SOLUTION INTRAVENOUS at 10:12

## 2018-07-11 RX ADMIN — LIDOCAINE HYDROCHLORIDE 5 ML: 10 INJECTION, SOLUTION EPIDURAL; INFILTRATION; INTRACAUDAL; PERINEURAL at 11:27

## 2018-07-11 RX ADMIN — PROPOFOL 250 MG: 10 INJECTION, EMULSION INTRAVENOUS at 11:27

## 2018-07-11 ASSESSMENT — PAIN - FUNCTIONAL ASSESSMENT: PAIN_FUNCTIONAL_ASSESSMENT: 0-10

## 2018-07-11 ASSESSMENT — PAIN SCALES - GENERAL
PAINLEVEL_OUTOF10: 0
PAINLEVEL_OUTOF10: 0

## 2018-07-11 NOTE — ANESTHESIA PRE PROCEDURE
solid food consumption: 07/10/18    BMI:   Wt Readings from Last 3 Encounters:   05/22/18 196 lb (88.9 kg)   05/16/18 203 lb (92.1 kg)   09/27/17 203 lb (92.1 kg)     There is no height or weight on file to calculate BMI.    CBC: No results found for: WBC, RBC, HGB, HCT, MCV, RDW, PLT    CMP:   Lab Results   Component Value Date     08/01/2016    K 4.0 08/01/2016     08/01/2016    CO2 22 08/01/2016    BUN 21 08/01/2016    CREATININE 0.8 08/01/2016    LABGLOM >60 08/01/2016    GLUCOSE 104 08/01/2016    PROT 7.2 08/01/2016    CALCIUM 10.2 08/01/2016    BILITOT 0.3 08/01/2016    ALKPHOS 121 08/01/2016    AST 19 08/01/2016    ALT 27 08/01/2016       POC Tests: No results for input(s): POCGLU, POCNA, POCK, POCCL, POCBUN, POCHEMO, POCHCT in the last 72 hours. Coags: No results found for: PROTIME, INR, APTT    HCG (If Applicable): No results found for: PREGTESTUR, PREGSERUM, HCG, HCGQUANT     ABGs: No results found for: PHART, PO2ART, CIW1DQT, YUR8MZO, BEART, J3IPBPRS     Type & Screen (If Applicable):  No results found for: LABABO, [address]    Anesthesia Evaluation  Patient summary reviewed and Nursing notes reviewed no history of anesthetic complications:   Airway: Mallampati: II  TM distance: >3 FB   Neck ROM: full  Mouth opening: > = 3 FB Dental: normal exam         Pulmonary:Negative Pulmonary ROS and normal exam                               Cardiovascular:    (+) hypertension: moderate,          Beta Blocker:  Dose within 24 Hrs         Neuro/Psych:   (+) psychiatric history: stable with treatmentdepression/anxiety             GI/Hepatic/Renal:   (+) GERD: well controlled, bowel prep,           Endo/Other: Negative Endo/Other ROS             Pt had no PAT visit       Abdominal:           Vascular: negative vascular ROS. Anesthesia Plan      general     ASA 2       Induction: intravenous.       Anesthetic plan and risks discussed with

## 2018-07-11 NOTE — ANESTHESIA POSTPROCEDURE EVALUATION
Department of Anesthesiology  Postprocedure Note    Patient: Evelin Soriano  MRN: [id number]  YOB: 1950  Date of evaluation: 7/11/2018  Time:  11:39 AM     Procedure Summary     Date:  07/11/18 Room / Location:  [facility] ENDO 01 / [facility] OR    Anesthesia Start:  1127 Anesthesia Stop:      Procedure:  COLONOSCOPY (N/A ) Diagnosis:  (RB - ABD CRAMPING - DIARRHEA - FECAL INCONTINENCE)    Surgeon:  Matteo Keane DO Responsible Provider:  CHARLOTTE Infante CRNA    Anesthesia Type:  general ASA Status:  2          Anesthesia Type: general    Osiel Phase I:      Osiel Phase II:      Last vitals: Reviewed and per EMR flowsheets.        Anesthesia Post Evaluation    Patient location during evaluation: bedside  Patient participation: complete - patient participated  Level of consciousness: sleepy but conscious  Pain score: 0  Airway patency: patent  Nausea & Vomiting: no nausea and no vomiting  Complications: no  Cardiovascular status: hemodynamically stable and blood pressure returned to baseline  Respiratory status: acceptable and nasal cannula  Hydration status: stable

## 2018-07-13 NOTE — TELEPHONE ENCOUNTER
Last OV with BG aprn on 5-22-18. No FU scheduled. Patient called the office today from [phone] asking if BG aprn will send in refill on her Colestipol 1 gm 2 po Bid at a 90 day supply to her [facility] mail order Pharm[facility].  [place]

## 2018-07-16 RX ORDER — MONTELUKAST SODIUM 4 MG/1
2 TABLET, CHEWABLE ORAL 2 TIMES DAILY
Qty: 360 TABLET | Refills: 3 | Status: SHIPPED | OUTPATIENT
Start: 2018-07-16

## 2018-08-08 DIAGNOSIS — F41.1 GENERALIZED ANXIETY DISORDER: ICD-10-CM

## 2018-08-08 DIAGNOSIS — F32.0 MILD SINGLE CURRENT EPISODE OF MAJOR DEPRESSIVE DISORDER (HCC): ICD-10-CM

## 2018-08-08 RX ORDER — CLONAZEPAM 0.5 MG/1
TABLET ORAL
Qty: 90 TABLET | Refills: 0 | Status: SHIPPED | OUTPATIENT
Start: 2018-08-08 | End: 2018-09-06 | Stop reason: SDUPTHER

## 2018-08-08 NOTE — TELEPHONE ENCOUNTER
Called Rx into Pharmacy, left voice mail
tabs by mouth at bedtime as needed for sleep 1/2/18   CHARLOTTE Rios NP   atorvastatin (LIPITOR) 40 MG tablet  6/7/17   Historical Provider, MD   montelukast (SINGULAIR) 10 MG tablet  4/22/17   Historical Provider, MD   VITAMIN D, CHOLECALCIFEROL, PO Take by mouth daily    Historical Provider, MD   lisinopril (PRINIVIL;ZESTRIL) 10 MG tablet 10 mg daily  1/23/16   Historical Provider, MD   esomeprazole (NEXIUM) 40 MG capsule Take 40 mg by mouth daily  1/23/16   Historical Provider, MD   BYSTOLIC 10 MG tablet 2 times daily  1/23/16   Historical Provider, MD   Calcium Polycarbophil (FIBERCON PO) Take by mouth 2 times daily    Historical Provider, MD   calcium carbonate (OSCAL) 500 MG TABS tablet Take 500 mg by mouth 2 times daily    Historical Provider, MD   Glucosamine-Chondroitin (GLUCOSAMINE CHONDR COMPLEX PO) Take by mouth    Historical Provider, MD   Cetirizine HCl 10 MG CAPS Take by mouth daily    Historical Provider, MD   Multiple Vitamins-Minerals (CENTRUM SILVER ULTRA WOMENS PO) Take by mouth daily    Historical Provider, MD       MSE:  Patient is  A & O x3. Appearance:  well-appearing appropriately dressed for season and age. Cognition:  Recent memory intact , remote memory intact , good fund of knowledge, below average  intelligence level. Speech:  normal  Language: Naming: intact; Word Finding: intact  Conversation no evidence of delusions  Behavior:  Cooperative, Good eye contact and guarded  Mood: depressed, anxious  Affect: restricted  Thought Content: no evidence of overt psychosis, delusional thought or suicidal /homicidal ideation or plan  Thought Process: linear, coherent  Judgement Insight:  normal and appropriate  Gait and Station:normal gait and station and normal balance   Musculoskeletal: WNL      Assesment:   1. Mild single current episode of major depressive disorder ([facility] [address]    2.  Generalized anxiety disorder        Plan:  Continue clonazepam 0.5 mg PO take 1 tablet qam and 2

## 2018-08-10 RX ORDER — METHYLPHENIDATE HYDROCHLORIDE 5 MG/1
5 TABLET ORAL 2 TIMES DAILY
Qty: 60 TABLET | Refills: 0 | OUTPATIENT
Start: 2018-08-10 | End: 2018-09-09

## 2018-08-20 RX ORDER — DICYCLOMINE HYDROCHLORIDE 10 MG/1
10 CAPSULE ORAL
Qty: 120 CAPSULE | Refills: 0 | Status: SHIPPED | OUTPATIENT
Start: 2018-08-20

## 2018-09-04 DIAGNOSIS — F32.0 MILD SINGLE CURRENT EPISODE OF MAJOR DEPRESSIVE DISORDER (HCC): ICD-10-CM

## 2018-09-04 DIAGNOSIS — F41.1 GENERALIZED ANXIETY DISORDER: ICD-10-CM

## 2018-09-04 RX ORDER — FLUOXETINE HYDROCHLORIDE 20 MG/1
CAPSULE ORAL
Qty: 90 CAPSULE | Refills: 0 | Status: SHIPPED | OUTPATIENT
Start: 2018-09-04 | End: 2018-11-13 | Stop reason: DRUGHIGH

## 2018-09-04 RX ORDER — FLUOXETINE HYDROCHLORIDE 40 MG/1
CAPSULE ORAL
Qty: 90 CAPSULE | Refills: 0 | Status: SHIPPED | OUTPATIENT
Start: 2018-09-04 | End: 2018-11-13 | Stop reason: SDUPTHER

## 2018-09-06 DIAGNOSIS — F41.1 GENERALIZED ANXIETY DISORDER: ICD-10-CM

## 2018-09-06 DIAGNOSIS — F32.0 MILD SINGLE CURRENT EPISODE OF MAJOR DEPRESSIVE DISORDER (HCC): ICD-10-CM

## 2018-09-06 RX ORDER — CLONAZEPAM 0.5 MG/1
TABLET ORAL
Qty: 90 TABLET | Refills: 0 | Status: SHIPPED | OUTPATIENT
Start: 2018-09-06 | End: 2018-10-08 | Stop reason: SDUPTHER

## 2018-09-06 NOTE — TELEPHONE ENCOUNTER
Pt is needing a refill on the following; Pt was last seen on 05/16/18 and has a follow up on 11/13/18 9/6/2018 1:42 PM   Progress Note        Max Melo 1950  Psychotherapy Time Spent: 20 min      Psychotherapy Topics: health and sleep and medication management    Chief Complaint   Patient presents with    Medication Refill         Subjective:  Patient is a 74year old  diagnosed with MDD, SIMONA, and complicated grief and presents today for follow-up. Last seen in clinic by ULYSSES Howe, in coverage for Dr. Eli Bethea on 1/2/18 and prior records were reviewed. Today patient states, \"things are fine. \" Says she takes the trazodone \"randomly, not on a regular basis. They give me a headache in the morning. \" Sleep is ok. Reports she gets 5-6 hours nightly. Appetite is good. Reports \"spurts\" of depression and anxiety that is usually triggered by something. Patient attends counseling with Carrie Ramon once a month. No medication changes needed at this time. Patient reports side effects as follows: none. No evidence of EPS, no cogwheeling or abnormal motor movements. Absent  suicidal ideation. Reports compliance with medications as good . Review of Systems - 12 point review negative  History obtained via chart review and patient  PCP is CHARLOTTE Desai      Current Meds:    Prior to Admission medications    Medication Sig Start Date End Date Taking? Authorizing Provider   FLUoxetine (PROZAC) 40 MG capsule TAKE 1 CAPSULE DAILY 9/4/18   CHARLOTTE Diaz   FLUoxetine (PROZAC) 20 MG capsule TAKE 1 CAPSULE DAILY 9/4/18   CHARLOTTE Diaz   dicyclomine (BENTYL) 10 MG capsule TAKE 1 CAPSULE BY MOUTH 4 TIMES DAILY (BEFORE MEALS AND NIGHTLY) 8/20/18   Vic Chaney,    clonazePAM (KLONOPIN) 0.5 MG tablet Take 1 tab by mouth every morning and two tabs by mouth at bedtime.  8/8/18 9/8/18  CHARLOTTE Diaz   colestipol (COLESTID) 1 g tablet Take 2 tablets by mouth 2 times daily 7/16/18   CHARLOTTE Alvarez   traZODone (DESYREL) 150 MG tablet Take 1 up to 2 whole tabs by mouth at bedtime as needed for sleep 1/2/18   CHARLOTTE Sim - NP   atorvastatin (LIPITOR) 40 MG tablet  6/7/17   Historical Provider, MD   montelukast (SINGULAIR) 10 MG tablet  4/22/17   Historical Provider, MD   VITAMIN D, CHOLECALCIFEROL, PO Take by mouth daily    Historical Provider, MD   lisinopril (PRINIVIL;ZESTRIL) 10 MG tablet 10 mg daily  1/23/16   Historical Provider, MD   esomeprazole (NEXIUM) 40 MG capsule Take 40 mg by mouth daily  1/23/16   Historical Provider, MD   BYSTOLIC 10 MG tablet 2 times daily  1/23/16   Historical Provider, MD   Calcium Polycarbophil (FIBERCON PO) Take by mouth 2 times daily    Historical Provider, MD   calcium carbonate (OSCAL) 500 MG TABS tablet Take 500 mg by mouth 2 times daily    Historical Provider, MD   Glucosamine-Chondroitin (GLUCOSAMINE CHONDR COMPLEX PO) Take by mouth    Historical Provider, MD   Cetirizine HCl 10 MG CAPS Take by mouth daily    Historical Provider, MD   Multiple Vitamins-Minerals (CENTRUM SILVER ULTRA WOMENS PO) Take by mouth daily    Historical Provider, MD       MSE:  Patient is  A & O x3. Appearance:  well-appearing appropriately dressed for season and age. Cognition:  Recent memory intact , remote memory intact , good fund of knowledge, below average  intelligence level. Speech:  normal  Language: Naming: intact; Word Finding: intact  Conversation no evidence of delusions  Behavior:  Cooperative, Good eye contact and guarded  Mood: depressed, anxious  Affect: restricted  Thought Content: no evidence of overt psychosis, delusional thought or suicidal /homicidal ideation or plan  Thought Process: linear, coherent  Judgement Insight:  normal and appropriate  Gait and Station:normal gait and station and normal balance   Musculoskeletal: WNL      Assesment:   1. Mild single current episode of major depressive disorder ([address]    2.  Generalized

## 2018-10-08 DIAGNOSIS — F41.1 GENERALIZED ANXIETY DISORDER: ICD-10-CM

## 2018-10-08 DIAGNOSIS — F32.0 MILD SINGLE CURRENT EPISODE OF MAJOR DEPRESSIVE DISORDER (HCC): ICD-10-CM

## 2018-10-08 RX ORDER — CLONAZEPAM 0.5 MG/1
TABLET ORAL
Qty: 90 TABLET | Refills: 0 | Status: SHIPPED | OUTPATIENT
Start: 2018-10-08 | End: 2018-11-13 | Stop reason: SDUPTHER

## 2018-11-13 ENCOUNTER — OFFICE VISIT (OUTPATIENT)
Dept: PSYCHIATRY | Age: 68
End: 2018-11-13
Payer: MEDICARE

## 2018-11-13 VITALS
BODY MASS INDEX: 29.62 KG/M2 | HEIGHT: 69 IN | SYSTOLIC BLOOD PRESSURE: 138 MMHG | HEART RATE: 82 BPM | WEIGHT: 200 LBS | DIASTOLIC BLOOD PRESSURE: 85 MMHG | OXYGEN SATURATION: 91 %

## 2018-11-13 DIAGNOSIS — F41.1 GENERALIZED ANXIETY DISORDER: ICD-10-CM

## 2018-11-13 DIAGNOSIS — F43.21 COMPLICATED GRIEF: Primary | ICD-10-CM

## 2018-11-13 DIAGNOSIS — F32.0 MILD SINGLE CURRENT EPISODE OF MAJOR DEPRESSIVE DISORDER (HCC): ICD-10-CM

## 2018-11-13 PROCEDURE — 3017F COLORECTAL CA SCREEN DOC REV: CPT | Performed by: NURSE PRACTITIONER

## 2018-11-13 PROCEDURE — 1090F PRES/ABSN URINE INCON ASSESS: CPT | Performed by: NURSE PRACTITIONER

## 2018-11-13 PROCEDURE — 1123F ACP DISCUSS/DSCN MKR DOCD: CPT | Performed by: NURSE PRACTITIONER

## 2018-11-13 PROCEDURE — G8419 CALC BMI OUT NRM PARAM NOF/U: HCPCS | Performed by: NURSE PRACTITIONER

## 2018-11-13 PROCEDURE — G8400 PT W/DXA NO RESULTS DOC: HCPCS | Performed by: NURSE PRACTITIONER

## 2018-11-13 PROCEDURE — G8484 FLU IMMUNIZE NO ADMIN: HCPCS | Performed by: NURSE PRACTITIONER

## 2018-11-13 PROCEDURE — 1101F PT FALLS ASSESS-DOCD LE1/YR: CPT | Performed by: NURSE PRACTITIONER

## 2018-11-13 PROCEDURE — G8427 DOCREV CUR MEDS BY ELIG CLIN: HCPCS | Performed by: NURSE PRACTITIONER

## 2018-11-13 PROCEDURE — 99214 OFFICE O/P EST MOD 30 MIN: CPT | Performed by: NURSE PRACTITIONER

## 2018-11-13 PROCEDURE — 4040F PNEUMOC VAC/ADMIN/RCVD: CPT | Performed by: NURSE PRACTITIONER

## 2018-11-13 PROCEDURE — 1036F TOBACCO NON-USER: CPT | Performed by: NURSE PRACTITIONER

## 2018-11-13 RX ORDER — METOPROLOL SUCCINATE 50 MG/1
TABLET, EXTENDED RELEASE ORAL
Refills: 0 | COMMUNITY
Start: 2018-11-06

## 2018-11-13 RX ORDER — POTASSIUM CHLORIDE 1500 MG/1
TABLET, EXTENDED RELEASE ORAL
COMMUNITY
Start: 2018-09-19

## 2018-11-13 RX ORDER — CLONAZEPAM 0.5 MG/1
TABLET ORAL
Qty: 90 TABLET | Refills: 0 | Status: SHIPPED | OUTPATIENT
Start: 2018-11-13 | End: 2018-12-10 | Stop reason: SDUPTHER

## 2018-11-13 RX ORDER — FLUOXETINE HYDROCHLORIDE 40 MG/1
CAPSULE ORAL
Qty: 180 CAPSULE | Refills: 0 | Status: SHIPPED | OUTPATIENT
Start: 2018-11-13 | End: 2019-01-20 | Stop reason: SDUPTHER

## 2018-12-10 DIAGNOSIS — F41.1 GENERALIZED ANXIETY DISORDER: ICD-10-CM

## 2018-12-10 DIAGNOSIS — F32.0 MILD SINGLE CURRENT EPISODE OF MAJOR DEPRESSIVE DISORDER (HCC): ICD-10-CM

## 2018-12-10 NOTE — TELEPHONE ENCOUNTER
Last ov 11/13/18   Next ov 02/08/19   Requested Prescriptions     Pending Prescriptions Disp Refills    clonazePAM (KLONOPIN) 0.5 MG tablet 90 tablet 0     Sig: Take 1 tab by mouth every morning and two tabs by mouth at bedtime. 12/10/2018 2:11 PM   Progress Note        Vick Lexa 1950  Psychotherapy Time Spent: 23 min      Psychotherapy Topics: family, financial and health    Chief Complaint   Patient presents with    Medication Refill         Subjective:  Patient is a 75 yo CF diagnosed with SIMONA, MDD, and complicated grief and presents today for follow-up. Last seen in clinic on 5/16/18 and prior records were reviewed. Today patient states, \"I'm doing fine. \" Patient continues to see Joanie Carrera for therapy. Reports she has some problems getting moving in the morning. \"Sometimes I just want to stay in bed. \" States \"I just don't feel like I'm normal.\" Patient says \"I feel suddenly old and that my life is over. \" Patient says she doesn't care about doing the things she used to do. \"I just want to be ok again. \" Patient says she sleeps well. Appetite is stable. Patient reports side effects as follows: none. No evidence of EPS, no cogwheeling or abnormal motor movements. Absent  suicidal ideation. Reports compliance with medications as good . Review of Systems - 12 point review negative except depression  History obtained via chart review and patient  PCP is Jesus Platt      Current Meds:    Prior to Admission medications    Medication Sig Start Date End Date Taking? Authorizing Provider   metoprolol succinate (TOPROL XL) 50 MG extended release tablet TAKE 1 TABLET BY MOUTH DAILY 11/6/18   Historical Provider, MD   KLOR-CON M20 20 MEQ extended release tablet  9/19/18   Historical Provider, MD   clonazePAM (KLONOPIN) 0.5 MG tablet Take 1 tab by mouth every morning and two tabs by mouth at bedtime.  11/13/18 12/14/18  Kristie Main APRNATALIE   FLUoxetine (PROZAC) 40 MG capsule Take 2

## 2018-12-11 RX ORDER — CLONAZEPAM 0.5 MG/1
TABLET ORAL
Qty: 90 TABLET | Refills: 0 | Status: SHIPPED | OUTPATIENT
Start: 2018-12-11 | End: 2019-01-11 | Stop reason: SDUPTHER

## 2019-01-11 DIAGNOSIS — F41.1 GENERALIZED ANXIETY DISORDER: ICD-10-CM

## 2019-01-11 DIAGNOSIS — F32.0 MILD SINGLE CURRENT EPISODE OF MAJOR DEPRESSIVE DISORDER (HCC): ICD-10-CM

## 2019-01-11 RX ORDER — CLONAZEPAM 0.5 MG/1
TABLET ORAL
Qty: 90 TABLET | Refills: 0 | Status: SHIPPED | OUTPATIENT
Start: 2019-01-11 | End: 2019-02-08 | Stop reason: SDUPTHER

## 2019-02-08 ENCOUNTER — OFFICE VISIT (OUTPATIENT)
Dept: PSYCHIATRY | Age: 69
End: 2019-02-08
Payer: MEDICARE

## 2019-02-08 VITALS
BODY MASS INDEX: 29.62 KG/M2 | DIASTOLIC BLOOD PRESSURE: 94 MMHG | SYSTOLIC BLOOD PRESSURE: 154 MMHG | OXYGEN SATURATION: 95 % | HEIGHT: 69 IN | WEIGHT: 200 LBS | HEART RATE: 80 BPM

## 2019-02-08 DIAGNOSIS — Z63.79 SUBSTANCE ABUSE IN FAMILY: ICD-10-CM

## 2019-02-08 DIAGNOSIS — Z60.4 ISOLATION (SOCIAL): ICD-10-CM

## 2019-02-08 DIAGNOSIS — F41.1 GENERALIZED ANXIETY DISORDER: ICD-10-CM

## 2019-02-08 DIAGNOSIS — F43.23 ADJUSTMENT DISORDER WITH MIXED ANXIETY AND DEPRESSED MOOD: ICD-10-CM

## 2019-02-08 DIAGNOSIS — F33.1 MODERATE EPISODE OF RECURRENT MAJOR DEPRESSIVE DISORDER (HCC): ICD-10-CM

## 2019-02-08 DIAGNOSIS — F43.21 COMPLICATED GRIEF: Primary | ICD-10-CM

## 2019-02-08 PROCEDURE — G8400 PT W/DXA NO RESULTS DOC: HCPCS | Performed by: NURSE PRACTITIONER

## 2019-02-08 PROCEDURE — 1090F PRES/ABSN URINE INCON ASSESS: CPT | Performed by: NURSE PRACTITIONER

## 2019-02-08 PROCEDURE — 4040F PNEUMOC VAC/ADMIN/RCVD: CPT | Performed by: NURSE PRACTITIONER

## 2019-02-08 PROCEDURE — G8427 DOCREV CUR MEDS BY ELIG CLIN: HCPCS | Performed by: NURSE PRACTITIONER

## 2019-02-08 PROCEDURE — 99214 OFFICE O/P EST MOD 30 MIN: CPT | Performed by: NURSE PRACTITIONER

## 2019-02-08 PROCEDURE — G8419 CALC BMI OUT NRM PARAM NOF/U: HCPCS | Performed by: NURSE PRACTITIONER

## 2019-02-08 PROCEDURE — 1036F TOBACCO NON-USER: CPT | Performed by: NURSE PRACTITIONER

## 2019-02-08 PROCEDURE — G8484 FLU IMMUNIZE NO ADMIN: HCPCS | Performed by: NURSE PRACTITIONER

## 2019-02-08 PROCEDURE — 1101F PT FALLS ASSESS-DOCD LE1/YR: CPT | Performed by: NURSE PRACTITIONER

## 2019-02-08 PROCEDURE — 1123F ACP DISCUSS/DSCN MKR DOCD: CPT | Performed by: NURSE PRACTITIONER

## 2019-02-08 PROCEDURE — 3017F COLORECTAL CA SCREEN DOC REV: CPT | Performed by: NURSE PRACTITIONER

## 2019-02-08 RX ORDER — CLONAZEPAM 0.5 MG/1
TABLET ORAL
Qty: 90 TABLET | Refills: 0 | Status: SHIPPED | OUTPATIENT
Start: 2019-02-08 | End: 2019-03-15 | Stop reason: SDUPTHER

## 2019-02-08 SDOH — SOCIAL STABILITY - SOCIAL INSECURITY: SOCIAL EXCLUSION AND REJECTION: Z60.4

## 2019-03-15 DIAGNOSIS — F41.1 GENERALIZED ANXIETY DISORDER: ICD-10-CM

## 2019-03-15 RX ORDER — CLONAZEPAM 0.5 MG/1
TABLET ORAL
Qty: 90 TABLET | Refills: 0 | Status: SHIPPED | OUTPATIENT
Start: 2019-03-15 | End: 2019-04-16 | Stop reason: SDUPTHER

## 2019-04-15 DIAGNOSIS — F41.1 GENERALIZED ANXIETY DISORDER: ICD-10-CM

## 2019-04-15 NOTE — TELEPHONE ENCOUNTER
Last ov 19  Next ov 19   Requested Prescriptions     Pending Prescriptions Disp Refills    clonazePAM (KLONOPIN) 0.5 MG tablet 90 tablet 0     Sig: Take 1 tab by mouth every morning and two tabs by mouth at bedtime     4/15/2019 9:03 AM   Progress Note        Ricardo Yañez 1950  Psychotherapy Time Spent: 22 min      Psychotherapy Topics: family, financial, health and occupational    Chief Complaint   Patient presents with    Medication Refill         Subjective:  Patient is a 80 yo CF diagnosed with SIMONA, MDD, complicated grief, Bereavement, and Adjustment D/O and presents today for follow-up. Last seen in clinic on 18 and prior records were reviewed. Patient is in therapy with Jennifer Mcguire.     Today patient states, \"I'm good. \" Patient has a new Jocy and she is the . She is feeling overwhelmed. Patient has moved to [facility] to help out her kids and is experiencing some adjustment. Patient complains of isolation. She reports some sadness over leaving friends in [place]. Says she has thought of going to Jainism in [facility] to make friends, but just can't make herself do it. Patient reports feeling guilty over not going to Jainism at the same time. Patient continues to grieve over her 's death 3 years ago. She states \"I feel like I  with Anton. I'm just existing. \" Patient reports feeling resentful over her plot in life. Discusses the disillusion of having the perfect family and then her  dying and her older son getting addicted to pain killers. Says son acts entitled and \"wants me to pay for everything. \" Discussed setting boundaries and the value of Al-Anon for family members. Even suggested going online to a meeting. Patient does not appear interested in attending [facility]. She seems overly concerned with other peoples' opinions of her.  She is exhausted taking care of her granddaughter M-F, yet worries her kids will think she's \"lazy\" if she asks for less time. Sleep is fair. Appetite is stable. Patient reports side effects as follows: none. No evidence of EPS, no cogwheeling or abnormal motor movements. Absent suicidal ideation. Reports compliance with medications as good . Review of Systems - 14 point review negative today except hypertension, IBS  History obtained via chart review and patient  PCP is CHARLOTTE Lamb CNP  BP (!) 154/94 (Site: Right Upper Arm, Position: Sitting, Cuff Size: Large Adult)   Pulse 80   Ht 5' 9\" (1.753 m)   Wt 200 lb (90.7 kg)   SpO2 95%   BMI 29.53 kg/m²       Current Meds:    Prior to Admission medications    Medication Sig Start Date End Date Taking?  Authorizing Provider   clonazePAM (KLONOPIN) 0.5 MG tablet Take 1 tab by mouth every morning and two tabs by mouth at bedtime 3/15/19 4/14/19  CHARLOTTE Palafox   FLUoxetine (PROZAC) 40 MG capsule TAKE 2 CAPSULES EVERY       MORNING 1/22/19   CHARLOTTE Palafox   metoprolol succinate (TOPROL XL) 50 MG extended release tablet TAKE 1 TABLET BY MOUTH DAILY 11/6/18   Historical Provider, MD   KLOR-CON M20 20 MEQ extended release tablet  9/19/18   Historical Provider, MD   dicyclomine (BENTYL) 10 MG capsule TAKE 1 CAPSULE BY MOUTH 4 TIMES DAILY (BEFORE MEALS AND NIGHTLY) 8/20/18   Vic Chaney DO   colestipol (COLESTID) 1 g tablet Take 2 tablets by mouth 2 times daily 7/16/18   CHARLOTTE Link   traZODone (DESYREL) 150 MG tablet Take 1 up to 2 whole tabs by mouth at bedtime as needed for sleep 1/2/18   CHARLOTTE Garcia - NP   atorvastatin (LIPITOR) 40 MG tablet  6/7/17   Historical Provider, MD   montelukast (SINGULAIR) 10 MG tablet  4/22/17   Historical Provider, MD   VITAMIN D, CHOLECALCIFEROL, PO Take by mouth daily    Historical Provider, MD   esomeprazole (NEXIUM) 40 MG capsule Take 40 mg by mouth daily  1/23/16   Historical Provider, MD   BYSTOLIC 10 MG tablet 2 times daily  1/23/16   Historical Provider, MD Calcium Polycarbophil (FIBERCON PO) Take by mouth 2 times daily    Historical Provider, MD   calcium carbonate (OSCAL) 500 MG TABS tablet Take 500 mg by mouth 2 times daily    Historical Provider, MD   Glucosamine-Chondroitin (GLUCOSAMINE CHONDR COMPLEX PO) Take by mouth    Historical Provider, MD   Cetirizine HCl 10 MG CAPS Take by mouth daily    Historical Provider, MD   Multiple Vitamins-Minerals (CENTRUM SILVER ULTRA WOMENS PO) Take by mouth daily    Historical Provider, MD      Lab Review   No visits with results within 6 Month(s) from this visit. Latest known visit with results is:   Orders Only on 08/01/2016   Component Date Value    Sodium 08/01/2016 142     Potassium 08/01/2016 4.0     Chloride 08/01/2016 104     CO2 08/01/2016 22     Anion Gap 08/01/2016 16     Glucose 08/01/2016 104     BUN 08/01/2016 21     CREATININE 08/01/2016 0.8     GFR Non- 08/01/2016 >60     Calcium 08/01/2016 10.2     Total Protein 08/01/2016 7.2     Alb 08/01/2016 4.1     Total Bilirubin 08/01/2016 0.3     Alkaline Phosphatase 08/01/2016 121*    ALT 08/01/2016 27     AST 08/01/2016 19     Globulin 08/01/2016 3.1     Cholesterol, Total 08/01/2016 166     Triglycerides 08/01/2016 271*    HDL 08/01/2016 45*    LDL Calculated 08/01/2016 67          MSE:  Patient is  A & O x3. Appearance:  street clothes, in chair, good grooming and good hygiene appropriately dressed for season and age. Cognition:  Recent memory intact , remote memory intact , good fund of knowledge, average  intelligence level. Speech:  normal  Language: Naming: intact;  Word Finding: intact  Conversation no evidence of delusions  Behavior:  Cooperative, Good eye contact and tearful  Mood: depressed, irritable and anxious  Affect: congruent with mood  Thought Content: no evidence of overt psychosis, delusional thought or suicidal /homicidal ideation or plan  Thought Process: linear, goal directed and coherent and associations appropriate  Concentration/ attention span: good  Judgement Insight:  fair and fair  Gait and Station:normal gait and station and normal balance   Musculoskeletal: WNL      Assesment:   1. Generalized anxiety disorder        Plan:  Continue medications as prescribed. Patient asks to send clonazepam to [facility]. Continue therapy with Jennifer Mcguire  Encouraged patient to attend [facility] in person or online  1. The risks, benefits, side effects, indications, contraindications, and adverse effects of the medications have been discussed. Yes.  2. The pt has verbalized understanding and has capacity to give informed consent. 3. The Damon Gene report has been reviewed according to [facility] regulations. 4. Supportive therapy offered. 5. Follow up: No follow-ups on file. 6. The patient has been advised to call with any problems. 7. Controlled substance Treatment Plan: this is a maintenance dose. .  8. The above listed medications have been continued, modifications in meds and other orders/labs as follows: No orders of the defined types were placed in this encounter. No orders of the defined types were placed in this encounter.       9. Additional comments:      Dank Aviles, SHABBIRP-BC

## 2019-04-16 RX ORDER — CLONAZEPAM 0.5 MG/1
TABLET ORAL
Qty: 90 TABLET | Refills: 0 | Status: SHIPPED | OUTPATIENT
Start: 2019-04-16 | End: 2019-05-29 | Stop reason: SDUPTHER

## 2019-05-03 ENCOUNTER — OFFICE VISIT (OUTPATIENT)
Dept: PSYCHIATRY | Age: 69
End: 2019-05-03
Payer: MEDICARE

## 2019-05-03 VITALS
OXYGEN SATURATION: 95 % | SYSTOLIC BLOOD PRESSURE: 128 MMHG | HEART RATE: 92 BPM | WEIGHT: 190 LBS | BODY MASS INDEX: 28.14 KG/M2 | HEIGHT: 69 IN | DIASTOLIC BLOOD PRESSURE: 86 MMHG

## 2019-05-03 DIAGNOSIS — Z63.79 SUBSTANCE ABUSE IN FAMILY: ICD-10-CM

## 2019-05-03 DIAGNOSIS — F43.23 ADJUSTMENT DISORDER WITH MIXED ANXIETY AND DEPRESSED MOOD: ICD-10-CM

## 2019-05-03 DIAGNOSIS — F33.1 MODERATE EPISODE OF RECURRENT MAJOR DEPRESSIVE DISORDER (HCC): ICD-10-CM

## 2019-05-03 DIAGNOSIS — F41.1 GENERALIZED ANXIETY DISORDER: Primary | ICD-10-CM

## 2019-05-03 DIAGNOSIS — F43.21 COMPLICATED GRIEF: ICD-10-CM

## 2019-05-03 PROCEDURE — 3017F COLORECTAL CA SCREEN DOC REV: CPT | Performed by: NURSE PRACTITIONER

## 2019-05-03 PROCEDURE — 1123F ACP DISCUSS/DSCN MKR DOCD: CPT | Performed by: NURSE PRACTITIONER

## 2019-05-03 PROCEDURE — 1090F PRES/ABSN URINE INCON ASSESS: CPT | Performed by: NURSE PRACTITIONER

## 2019-05-03 PROCEDURE — 4040F PNEUMOC VAC/ADMIN/RCVD: CPT | Performed by: NURSE PRACTITIONER

## 2019-05-03 PROCEDURE — 1036F TOBACCO NON-USER: CPT | Performed by: NURSE PRACTITIONER

## 2019-05-03 PROCEDURE — 99214 OFFICE O/P EST MOD 30 MIN: CPT | Performed by: NURSE PRACTITIONER

## 2019-05-03 PROCEDURE — G8419 CALC BMI OUT NRM PARAM NOF/U: HCPCS | Performed by: NURSE PRACTITIONER

## 2019-05-03 PROCEDURE — G8400 PT W/DXA NO RESULTS DOC: HCPCS | Performed by: NURSE PRACTITIONER

## 2019-05-03 PROCEDURE — G8427 DOCREV CUR MEDS BY ELIG CLIN: HCPCS | Performed by: NURSE PRACTITIONER

## 2019-05-03 RX ORDER — LISINOPRIL 10 MG/1
TABLET ORAL
COMMUNITY
Start: 2019-01-24

## 2019-05-03 RX ORDER — L. ACIDOPHILUS/L.BULGARICUS 1MM CELL
TABLET ORAL
Refills: 0 | COMMUNITY
Start: 2019-04-04

## 2019-05-03 RX ORDER — HYDROCODONE BITARTRATE AND ACETAMINOPHEN 7.5; 325 MG/1; MG/1
TABLET ORAL
Refills: 0 | COMMUNITY
Start: 2019-04-02

## 2019-05-03 NOTE — PROGRESS NOTES
Sig Start Date End Date Taking?  Authorizing Provider   HYDROcodone-acetaminophen (NORCO) 7.5-325 MG per tablet TAKE 1 TABLET BY MOUTH EVERY 4 TO 6 HOURS AS NEEDED FOR PAIN 4/2/19   Historical Provider, MD   lactobacillus acidophilus [facility]) TAKE 1 TABLET BY MOUTH THREE TIMES A DAY 4/4/19   Historical Provider, MD   lisinopril (PRINIVIL;ZESTRIL) 10 MG tablet  1/24/19   Historical Provider, MD   clonazePAM (KLONOPIN) 0.5 MG tablet Take 1 tab by mouth every morning and two tabs by mouth at bedtime 4/16/19 5/15/19  CHARLOTTE Verduzco   FLUoxetine (PROZAC) 40 MG capsule TAKE 2 CAPSULES EVERY       MORNING 1/22/19   CHARLOTTE Verduzco   metoprolol succinate (TOPROL XL) 50 MG extended release tablet TAKE 1 TABLET BY MOUTH DAILY 11/6/18   Historical Provider, MD   KLOR-CON M20 20 MEQ extended release tablet  9/19/18   Historical Provider, MD   dicyclomine (BENTYL) 10 MG capsule TAKE 1 CAPSULE BY MOUTH 4 TIMES DAILY (BEFORE MEALS AND NIGHTLY) 8/20/18   Vic Chaney DO   colestipol (COLESTID) 1 g tablet Take 2 tablets by mouth 2 times daily 7/16/18   CHARLOTTE Ya   atorvastatin (LIPITOR) 40 MG tablet  6/7/17   Historical Provider, MD   montelukast (SINGULAIR) 10 MG tablet  4/22/17   Historical Provider, MD   VITAMIN D, CHOLECALCIFEROL, PO Take by mouth daily    Historical Provider, MD   esomeprazole (NEXIUM) 40 MG capsule Take 40 mg by mouth daily  1/23/16   Historical Provider, MD   BYSTOLIC 10 MG tablet 2 times daily  1/23/16   Historical Provider, MD   Calcium Polycarbophil (FIBERCON PO) Take by mouth 2 times daily    Historical Provider, MD   calcium carbonate (OSCAL) 500 MG TABS tablet Take 500 mg by mouth 2 times daily    Historical Provider, MD   Glucosamine-Chondroitin (GLUCOSAMINE CHONDR COMPLEX PO) Take by mouth    Historical Provider, MD   Cetirizine HCl 10 MG CAPS Take by mouth daily    Historical Provider, MD   Multiple Vitamins-Minerals (CENTRUM SILVER ULTRA WOMENS PO) Take by mouth daily    Historical Provider, MD          Lab Review   No visits with results within 2 Month(s) from this visit. Latest known visit with results is:   Orders Only on 08/01/2016   Component Date Value    Sodium 08/01/2016 142     Potassium 08/01/2016 4.0     Chloride 08/01/2016 104     CO2 08/01/2016 22     Anion Gap 08/01/2016 16     Glucose 08/01/2016 104     BUN 08/01/2016 21     CREATININE 08/01/2016 0.8     GFR Non- 08/01/2016 >60     Calcium 08/01/2016 10.2     Total Protein 08/01/2016 7.2     Alb 08/01/2016 4.1     Total Bilirubin 08/01/2016 0.3     Alkaline Phosphatase 08/01/2016 121*    ALT 08/01/2016 27     AST 08/01/2016 19     Globulin 08/01/2016 3.1     Cholesterol, Total 08/01/2016 166     Triglycerides 08/01/2016 271*    HDL 08/01/2016 45*    LDL Calculated 08/01/2016 67          MSE:  Patient is  A & O x3. Appearance:  well-appearing, street clothes, in chair, good grooming and good hygiene appropriately dressed for season and age. Cognition:  Recent memory intact , remote memory intact , excellent fund of knowledge, average  intelligence level. Speech:  normal  Language: Naming: intact; Word Finding: intact  Conversation no evidence of delusions  Behavior:  Cooperative and Good eye contact  Mood: depressed, irritable and anxious  Affect: congruent with mood  Thought Content: no evidence of overt psychosis, delusional thought or suicidal /homicidal ideation or plan  Thought Process: linear, goal directed and coherent and associations appropriate  Concentration/ attention span: good  Judgement Insight:  normal and appropriate  Gait and Station:normal gait and station and normal balance   Musculoskeletal: WNL      Assesment:   1. Generalized anxiety disorder    2. Complicated grief    3. Moderate episode of recurrent major depressive disorder ([address]    4. Adjustment disorder with mixed anxiety and depressed mood    5.  Substance abuse in family Plan:  Continue medication as prescribed. 1. The risks, benefits, side effects, indications, contraindications, and adverse effects of the medications have been discussed. Yes.  2. The pt has verbalized understanding and has capacity to give informed consent. 3. The Mandeep Muñoz report has been reviewed according to [facility] regulations. 4. Supportive therapy offered. 5. Follow up: No follow-ups on file. 6. The patient has been advised to call with any problems. 7. Controlled substance Treatment Plan: this is a maintenance dose. 8. The above listed medications have been continued, modifications in meds and other orders/labs as follows: No orders of the defined types were placed in this encounter. No orders of the defined types were placed in this encounter. 9. Additional comments:    Over 50% of the total visit time was spent on counseling and/or coordination of care.     Thomas Coelho, SHABBIRP-BC

## 2019-05-29 DIAGNOSIS — F41.1 GENERALIZED ANXIETY DISORDER: ICD-10-CM

## 2019-05-29 NOTE — TELEPHONE ENCOUNTER
Last OV:  05.03.19  Next OV:  08.06.19    Requested Prescriptions     Pending Prescriptions Disp Refills    clonazePAM (KLONOPIN) 0.5 MG tablet [Pharmacy Med Name: CLONAZEPAM 0.5 MG TABLET] 90 tablet 0     Sig: TAKE 1 TAB BY MOUTH EVERY MORNING AND TWO TABS BY MOUTH AT BEDTIME       5/29/2019 8:37 AM   Progress Note        Terri Schmidta 1950  Psychotherapy Time Spent: 22 min      Psychotherapy Topics: family, financial and health    Chief Complaint   Patient presents with    Medication Refill         Subjective:  Patient is a 80 yo CF diagnosed with SIMONA, MDD, Complicated grief, bereavement, and Adjustment D/O and presents today for follow-up. Last seen in clinic on 2/8/19 and prior records were reviewed. Today patient states, \"I've been sick but the baby has been sick too. \" Patient says she is so busy with her granddaughter that she hasn't had much to meet new people. Reports she feels less taken advantage of with the babysitting. Her granddaughter is now going to to  t/th. Patient says she and her daughter-in-law are not getting along very well. Says they are currently not speaking. Patient thinks her son and DIL are having problems. Patient continues to endorse depression. Says she is at a 2 or a 3 when it comes to energy. Patient says she looks forward to the warmer months when she can get out with her grandbaby. She is not tearful today. She appears to be more accepting of her situation and how she fits into it. Grief and bereavement are not discussed today. Patient continues to adjust but is using coping mechanisms to help get her through the tough times. No medications changes requested today. Patient reports side effects as follows: none. No evidence of EPS, no cogwheeling or abnormal motor movements. Absent  suicidal ideation. Reports compliance with medications as good .      Review of Systems - 14 point review negative today except URI, cough  History obtained via chart review and patient  PCP is CHARLOTTE Lamb - CNP  /86 (Site: Right Upper Arm, Position: Sitting, Cuff Size: Medium Adult)   Pulse 92   Ht 5' 9\" (1.753 m)   Wt 190 lb (86.2 kg)   SpO2 95%   BMI 28.06 kg/m²       Current Meds:    Prior to Admission medications    Medication Sig Start Date End Date Taking?  Authorizing Provider   HYDROcodone-acetaminophen (NORCO) 7.5-325 MG per tablet TAKE 1 TABLET BY MOUTH EVERY 4 TO 6 HOURS AS NEEDED FOR PAIN 4/2/19   Historical Provider, MD   lactobacillus acidophilus [facility]) TAKE 1 TABLET BY MOUTH THREE TIMES A DAY 4/4/19   Historical Provider, MD   lisinopril (PRINIVIL;ZESTRIL) 10 MG tablet  1/24/19   Historical Provider, MD   clonazePAM (KLONOPIN) 0.5 MG tablet Take 1 tab by mouth every morning and two tabs by mouth at bedtime 4/16/19 5/15/19  CHARLOTTE Palafox   FLUoxetine (PROZAC) 40 MG capsule TAKE 2 CAPSULES EVERY       MORNING 1/22/19   CHARLOTTE Palafox   metoprolol succinate (TOPROL XL) 50 MG extended release tablet TAKE 1 TABLET BY MOUTH DAILY 11/6/18   Historical Provider, MD   KLOR-CON M20 20 MEQ extended release tablet  9/19/18   Historical Provider, MD   dicyclomine (BENTYL) 10 MG capsule TAKE 1 CAPSULE BY MOUTH 4 TIMES DAILY (BEFORE MEALS AND NIGHTLY) 8/20/18   Vic Chaney DO   colestipol (COLESTID) 1 g tablet Take 2 tablets by mouth 2 times daily 7/16/18   CHARLOTTE Link   atorvastatin (LIPITOR) 40 MG tablet  6/7/17   Historical Provider, MD   montelukast (SINGULAIR) 10 MG tablet  4/22/17   Historical Provider, MD   VITAMIN D, CHOLECALCIFEROL, PO Take by mouth daily    Historical Provider, MD   esomeprazole (NEXIUM) 40 MG capsule Take 40 mg by mouth daily  1/23/16   Historical Provider, MD   BYSTOLIC 10 MG tablet 2 times daily  1/23/16   Historical Provider, MD   Calcium Polycarbophil (FIBERCON PO) Take by mouth 2 times daily    Historical Provider, MD   calcium carbonate (OSCAL) 500 MG TABS tablet Take 500 mg by mouth 2 times daily    Historical Provider, MD   Glucosamine-Chondroitin (GLUCOSAMINE CHONDR COMPLEX PO) Take by mouth    Historical Provider, MD   Cetirizine HCl 10 MG CAPS Take by mouth daily    Historical Provider, MD   Multiple Vitamins-Minerals (CENTRUM SILVER ULTRA WOMENS PO) Take by mouth daily    Historical Provider, MD          Lab Review   No visits with results within 2 Month(s) from this visit. Latest known visit with results is:   Orders Only on 08/01/2016   Component Date Value    Sodium 08/01/2016 142     Potassium 08/01/2016 4.0     Chloride 08/01/2016 104     CO2 08/01/2016 22     Anion Gap 08/01/2016 16     Glucose 08/01/2016 104     BUN 08/01/2016 21     CREATININE 08/01/2016 0.8     GFR Non- 08/01/2016 >60     Calcium 08/01/2016 10.2     Total Protein 08/01/2016 7.2     Alb 08/01/2016 4.1     Total Bilirubin 08/01/2016 0.3     Alkaline Phosphatase 08/01/2016 121*    ALT 08/01/2016 27     AST 08/01/2016 19     Globulin 08/01/2016 3.1     Cholesterol, Total 08/01/2016 166     Triglycerides 08/01/2016 271*    HDL 08/01/2016 45*    LDL Calculated 08/01/2016 67          MSE:  Patient is  A & O x3. Appearance:  well-appearing, street clothes, in chair, good grooming and good hygiene appropriately dressed for season and age. Cognition:  Recent memory intact , remote memory intact , excellent fund of knowledge, average  intelligence level. Speech:  normal  Language: Naming: intact;  Word Finding: intact  Conversation no evidence of delusions  Behavior:  Cooperative and Good eye contact  Mood: depressed, irritable and anxious  Affect: congruent with mood  Thought Content: no evidence of overt psychosis, delusional thought or suicidal /homicidal ideation or plan  Thought Process: linear, goal directed and coherent and associations appropriate  Concentration/ attention span: good  Judgement Insight:  normal and appropriate  Gait and Station:normal gait and station and normal balance   Musculoskeletal: WNL      Assesment:   1. Generalized anxiety disorder        Plan:  Continue medication as prescribed. 1. The risks, benefits, side effects, indications, contraindications, and adverse effects of the medications have been discussed. Yes.  2. The pt has verbalized understanding and has capacity to give informed consent. 3. The Jose R Barraza report has been reviewed according to [facility] regulations. 4. Supportive therapy offered. 5. Follow up: No follow-ups on file. 6. The patient has been advised to call with any problems. 7. Controlled substance Treatment Plan: this is a maintenance dose. 8. The above listed medications have been continued, modifications in meds and other orders/labs as follows: No orders of the defined types were placed in this encounter. No orders of the defined types were placed in this encounter. 9. Additional comments:    Over 50% of the total visit time was spent on counseling and/or coordination of care.     Samira Kiran, [email or web address]

## 2019-05-30 RX ORDER — CLONAZEPAM 0.5 MG/1
TABLET ORAL
Qty: 90 TABLET | Refills: 0 | Status: SHIPPED | OUTPATIENT
Start: 2019-05-30 | End: 2019-06-27 | Stop reason: SDUPTHER

## 2019-06-27 DIAGNOSIS — F41.1 GENERALIZED ANXIETY DISORDER: ICD-10-CM

## 2019-06-27 RX ORDER — CLONAZEPAM 0.5 MG/1
TABLET ORAL
Qty: 90 TABLET | Refills: 0 | Status: SHIPPED | OUTPATIENT
Start: 2019-06-27 | End: 2019-08-01 | Stop reason: SDUPTHER

## 2019-06-27 NOTE — TELEPHONE ENCOUNTER
128/86 (Site: Right Upper Arm, Position: Sitting, Cuff Size: Medium Adult)   Pulse 92   Ht 5' 9\" (1.753 m)   Wt 190 lb (86.2 kg)   SpO2 95%   BMI 28.06 kg/m²       Current Meds:    Prior to Admission medications    Medication Sig Start Date End Date Taking?  Authorizing Provider   clonazePAM (KLONOPIN) 0.5 MG tablet TAKE 1 TAB BY MOUTH EVERY MORNING AND TWO TABS BY MOUTH AT BEDTIME 5/30/19 6/30/19  CHARLOTTE Reddy - RADHA   HYDROcodone-acetaminophen (NORCO) 7.5-325 MG per tablet TAKE 1 TABLET BY MOUTH EVERY 4 TO 6 HOURS AS NEEDED FOR PAIN 4/2/19   Historical Provider, MD   lactobacillus acidophilus [facility]) TAKE 1 TABLET BY MOUTH THREE TIMES A DAY 4/4/19   Historical Provider, MD   lisinopril (PRINIVIL;ZESTRIL) 10 MG tablet  1/24/19   Historical Provider, MD   FLUoxetine (PROZAC) 40 MG capsule TAKE 2 CAPSULES EVERY       MORNING 1/22/19   CHARLOTTE Gorman   metoprolol succinate (TOPROL XL) 50 MG extended release tablet TAKE 1 TABLET BY MOUTH DAILY 11/6/18   Historical Provider, MD   KLOR-CON M20 20 MEQ extended release tablet  9/19/18   Historical Provider, MD   dicyclomine (BENTYL) 10 MG capsule TAKE 1 CAPSULE BY MOUTH 4 TIMES DAILY (BEFORE MEALS AND NIGHTLY) 8/20/18   Vic Chaney DO   colestipol (COLESTID) 1 g tablet Take 2 tablets by mouth 2 times daily 7/16/18   CHARLOTTE Easton   atorvastatin (LIPITOR) 40 MG tablet  6/7/17   Historical Provider, MD   montelukast (SINGULAIR) 10 MG tablet  4/22/17   Historical Provider, MD   VITAMIN D, CHOLECALCIFEROL, PO Take by mouth daily    Historical Provider, MD   esomeprazole (NEXIUM) 40 MG capsule Take 40 mg by mouth daily  1/23/16   Historical Provider, MD   BYSTOLIC 10 MG tablet 2 times daily  1/23/16   Historical Provider, MD   Calcium Polycarbophil (FIBERCON PO) Take by mouth 2 times daily    Historical Provider, MD   calcium carbonate (OSCAL) 500 MG TABS tablet Take 500 mg by mouth 2 times daily    Historical Provider, MD Glucosamine-Chondroitin (GLUCOSAMINE CHONDR COMPLEX PO) Take by mouth    Historical Provider, MD   Cetirizine HCl 10 MG CAPS Take by mouth daily    Historical Provider, MD   Multiple Vitamins-Minerals (CENTRUM SILVER ULTRA WOMENS PO) Take by mouth daily    Historical Provider, MD          Lab Review   No visits with results within 2 Month(s) from this visit. Latest known visit with results is:   Orders Only on 08/01/2016   Component Date Value    Sodium 08/01/2016 142     Potassium 08/01/2016 4.0     Chloride 08/01/2016 104     CO2 08/01/2016 22     Anion Gap 08/01/2016 16     Glucose 08/01/2016 104     BUN 08/01/2016 21     CREATININE 08/01/2016 0.8     GFR Non- 08/01/2016 >60     Calcium 08/01/2016 10.2     Total Protein 08/01/2016 7.2     Alb 08/01/2016 4.1     Total Bilirubin 08/01/2016 0.3     Alkaline Phosphatase 08/01/2016 121*    ALT 08/01/2016 27     AST 08/01/2016 19     Globulin 08/01/2016 3.1     Cholesterol, Total 08/01/2016 166     Triglycerides 08/01/2016 271*    HDL 08/01/2016 45*    LDL Calculated 08/01/2016 67          MSE:  Patient is  A & O x3. Appearance:  well-appearing, street clothes, in chair, good grooming and good hygiene appropriately dressed for season and age. Cognition:  Recent memory intact , remote memory intact , excellent fund of knowledge, average  intelligence level. Speech:  normal  Language: Naming: intact;  Word Finding: intact  Conversation no evidence of delusions  Behavior:  Cooperative and Good eye contact  Mood: depressed, irritable and anxious  Affect: congruent with mood  Thought Content: no evidence of overt psychosis, delusional thought or suicidal /homicidal ideation or plan  Thought Process: linear, goal directed and coherent and associations appropriate  Concentration/ attention span: good  Judgement Insight:  normal and appropriate  Gait and Station:normal gait and station and normal balance   Musculoskeletal: WNL      Assesment:   1. Generalized anxiety disorder        Plan:  Continue medication as prescribed. 1. The risks, benefits, side effects, indications, contraindications, and adverse effects of the medications have been discussed. Yes.  2. The pt has verbalized understanding and has capacity to give informed consent. 3. The Abdi Jackson report has been reviewed according to [facility] regulations. 4. Supportive therapy offered. 5. Follow up: No follow-ups on file. 6. The patient has been advised to call with any problems. 7. Controlled substance Treatment Plan: this is a maintenance dose. 8. The above listed medications have been continued, modifications in meds and other orders/labs as follows: No orders of the defined types were placed in this encounter. No orders of the defined types were placed in this encounter. 9. Additional comments:    Over 50% of the total visit time was spent on counseling and/or coordination of care.     Judith Trinidad, SHABBIRP-BC

## 2019-07-31 DIAGNOSIS — F32.0 MILD SINGLE CURRENT EPISODE OF MAJOR DEPRESSIVE DISORDER (HCC): ICD-10-CM

## 2019-07-31 DIAGNOSIS — F41.1 GENERALIZED ANXIETY DISORDER: ICD-10-CM

## 2019-07-31 NOTE — TELEPHONE ENCOUNTER
delusions  Behavior:  Cooperative and Good eye contact  Mood: depressed, irritable and anxious  Affect: congruent with mood  Thought Content: no evidence of overt psychosis, delusional thought or suicidal /homicidal ideation or plan  Thought Process: linear, goal directed and coherent and associations appropriate  Concentration/ attention span: good  Judgement Insight:  normal and appropriate  Gait and Station:normal gait and station and normal balance   Musculoskeletal: WNL        Assesment:      1. Generalized anxiety disorder    2. Complicated grief    3. Moderate episode of recurrent major depressive disorder ([facility] [address]    4. Adjustment disorder with mixed anxiety and depressed mood    5. Substance abuse in family          Plan:  Continue medication as prescribed. 1. The risks, benefits, side effects, indications, contraindications, and adverse effects of the medications have been discussed. Yes.  2. The pt has verbalized understanding and has capacity to give informed consent. 3. The Brittany Sand report has been reviewed according to [facility] regulations. 4. Supportive therapy offered. 5. Follow up:    No follow-ups on file. 6. The patient has been advised to call with any problems. 7. Controlled substance Treatment Plan: this is a maintenance dose. 8. The above listed medications have been continued, modifications in meds and other orders/labs as follows:                 Encounter Medications    No orders of the defined types were placed in this encounter.                  No orders of the defined types were placed in this encounter.        9.  Additional comments:     Over 50% of the total visit time was spent on counseling and/or coordination of care.     FINESSE Ceballos-BC                Office Visit on 5/3/2019          Routing History          Detailed Report      Progress Notes Info     Author Note Status Last Update User   CHARLOTTE Meek Signed CHARLOTTE Meek   Last Update Date/Time:

## 2019-07-31 NOTE — TELEPHONE ENCOUNTER
Rj Carson called to request a refill on her medication. Last office visit : 5/3/2019   Next office visit : Visit date not found   Pt has appt mid August with a new provider. Requested Prescriptions     Pending Prescriptions Disp Refills    FLUoxetine (PROZAC) 40 MG capsule 180 capsule 1     Sig: TAKE 2 CAPSULES EVERY       MORNING            Danette Manley RN                              Signed        Expand All Collapse All      5/3/2019 1:08 PM                               Progress Note           Rj Sheets 1950                    Psychotherapy Time Spent: 22 min                                                  Psychotherapy Topics: family, financial and health         Chief Complaint   Patient presents with    Depression    Anxiety            Subjective:  Patient is a 80 yo CF diagnosed with SIMONA, MDD, Complicated grief, bereavement, and Adjustment D/O and presents today for follow-up. Last seen in clinic on 2/8/19 and prior records were reviewed.       Today patient states, \"I've been sick but the baby has been sick too. \" Patient says she is so busy with her granddaughter that she hasn't had much to meet new people. Reports she feels less taken advantage of with the babysitting. Her granddaughter is now going to to  t/th. Patient says she and her daughter-in-law are not getting along very well. Says they are currently not speaking. Patient thinks her son and DIL are having problems. Patient continues to endorse depression. Says she is at a 2 or a 3 when it comes to energy. Patient says she looks forward to the warmer months when she can get out with her grandbaby. She is not tearful today. She appears to be more accepting of her situation and how she fits into it. Grief and bereavement are not discussed today. Patient continues to adjust but is using coping mechanisms to help get her through the tough times.  No medications changes requested today.      Patient reports side effects esomeprazole (NEXIUM) 40 MG capsule Take 40 mg by mouth daily  1/23/16     Historical Provider, MD   BYSTOLIC 10 MG tablet 2 times daily  1/23/16     Historical Provider, MD   Calcium Polycarbophil (FIBERCON PO) Take by mouth 2 times daily       Historical Provider, MD   calcium carbonate (OSCAL) 500 MG TABS tablet Take 500 mg by mouth 2 times daily       Historical Provider, MD   Glucosamine-Chondroitin (GLUCOSAMINE CHONDR COMPLEX PO) Take by mouth       Historical Provider, MD   Cetirizine HCl 10 MG CAPS Take by mouth daily       Historical Provider, MD   Multiple Vitamins-Minerals (CENTRUM SILVER ULTRA WOMENS PO) Take by mouth daily       Historical Provider, MD               Lab Review         No visits with results within 2 Month(s) from this visit. Latest known visit with results is:   Orders Only on 08/01/2016   Component Date Value    Sodium 08/01/2016 142     Potassium 08/01/2016 4.0     Chloride 08/01/2016 104     CO2 08/01/2016 22     Anion Gap 08/01/2016 16     Glucose 08/01/2016 104     BUN 08/01/2016 21     CREATININE 08/01/2016 0.8     GFR Non- 08/01/2016 >60     Calcium 08/01/2016 10.2     Total Protein 08/01/2016 7.2     Alb 08/01/2016 4.1     Total Bilirubin 08/01/2016 0.3     Alkaline Phosphatase 08/01/2016 121*    ALT 08/01/2016 27     AST 08/01/2016 19     Globulin 08/01/2016 3.1     Cholesterol, Total 08/01/2016 166     Triglycerides 08/01/2016 271*    HDL 08/01/2016 45*    LDL Calculated 08/01/2016 67             MSE:  Patient is  A & O x3. Appearance:  well-appearing, street clothes, in chair, good grooming and good hygiene appropriately dressed for season and age. Cognition:  Recent memory intact , remote memory intact , excellent fund of knowledge, average  intelligence level. Speech:  normal  Language: Naming: intact;  Word Finding: intact  Conversation no evidence of delusions  Behavior:  Cooperative and Good eye contact  Mood: depressed, irritable and anxious  Affect: congruent with mood  Thought Content: no evidence of overt psychosis, delusional thought or suicidal /homicidal ideation or plan  Thought Process: linear, goal directed and coherent and associations appropriate  Concentration/ attention span: good  Judgement Insight:  normal and appropriate  Gait and Station:normal gait and station and normal balance   Musculoskeletal: WNL        Assesment:      1. Generalized anxiety disorder    2. Complicated grief    3. Moderate episode of recurrent major depressive disorder ([address]    4. Adjustment disorder with mixed anxiety and depressed mood    5. Substance abuse in family          Plan:  Continue medication as prescribed. 1. The risks, benefits, side effects, indications, contraindications, and adverse effects of the medications have been discussed. Yes.  2. The pt has verbalized understanding and has capacity to give informed consent. 3. The Terrell Scott report has been reviewed according to [facility] regulations. 4. Supportive therapy offered. 5. Follow up:    No follow-ups on file. 6. The patient has been advised to call with any problems. 7. Controlled substance Treatment Plan: this is a maintenance dose. 8. The above listed medications have been continued, modifications in meds and other orders/labs as follows:                 Encounter Medications    No orders of the defined types were placed in this encounter.                  No orders of the defined types were placed in this encounter.        9.  Additional comments:     Over 50% of the total visit time was spent on counseling and/or coordination of care.     FINESSE Nicolas-BC                Office Visit on 5/3/2019          Routing History          Detailed Report      Progress Notes Info     Author Note Status Last Update User   CHARLOTTE Mcgee Signed CHARLOTTE Mcgee   Last Update Date/Time: 5/3/2019  1:08 PM   Chart Review Routing History     Recipient Method

## 2019-08-01 RX ORDER — FLUOXETINE HYDROCHLORIDE 40 MG/1
CAPSULE ORAL
Qty: 60 CAPSULE | Refills: 0 | Status: SHIPPED | OUTPATIENT
Start: 2019-08-01 | End: 2019-08-30 | Stop reason: SDUPTHER

## 2019-08-01 RX ORDER — CLONAZEPAM 0.5 MG/1
TABLET ORAL
Qty: 90 TABLET | Refills: 0 | Status: SHIPPED | OUTPATIENT
Start: 2019-08-01 | End: 2019-09-01

## 2019-08-30 ENCOUNTER — TELEPHONE (OUTPATIENT)
Dept: PSYCHIATRY | Age: 69
End: 2019-08-30

## 2019-08-30 DIAGNOSIS — F41.1 GENERALIZED ANXIETY DISORDER: ICD-10-CM

## 2019-08-30 DIAGNOSIS — F32.0 MILD SINGLE CURRENT EPISODE OF MAJOR DEPRESSIVE DISORDER (HCC): ICD-10-CM

## 2019-08-30 RX ORDER — FLUOXETINE HYDROCHLORIDE 40 MG/1
CAPSULE ORAL
Qty: 60 CAPSULE | Refills: 0 | Status: SHIPPED | OUTPATIENT
Start: 2019-08-30

## 2019-09-30 ENCOUNTER — TELEPHONE (OUTPATIENT)
Dept: PSYCHIATRY | Age: 69
End: 2019-09-30

## 2019-11-07 ENCOUNTER — TELEPHONE (OUTPATIENT)
Dept: PSYCHIATRY | Age: 69
End: 2019-11-07

## 2020-04-14 ENCOUNTER — TELEPHONE (OUTPATIENT)
Dept: PSYCHIATRY | Age: 70
End: 2020-04-14

## 2020-04-14 NOTE — TELEPHONE ENCOUNTER
Received fax requesting PA for [employer]. Called [facility] at [phone] and spoke with Nunu Castaneda who says the request needs to go to Dr Kelton Lyman. She was given Dr Yovani Hodges current contact information.

## (undated) DEVICE — ENDO KIT,LOURDES HOSPITAL: Brand: MEDLINE INDUSTRIES, INC.

## (undated) DEVICE — TRAP POLYP ETRAP